# Patient Record
Sex: FEMALE | Race: WHITE | NOT HISPANIC OR LATINO | URBAN - METROPOLITAN AREA
[De-identification: names, ages, dates, MRNs, and addresses within clinical notes are randomized per-mention and may not be internally consistent; named-entity substitution may affect disease eponyms.]

---

## 2017-05-31 ENCOUNTER — OUTPATIENT (OUTPATIENT)
Dept: OUTPATIENT SERVICES | Facility: HOSPITAL | Age: 77
LOS: 1 days | Discharge: HOME | End: 2017-05-31

## 2017-06-28 ENCOUNTER — OUTPATIENT (OUTPATIENT)
Dept: OUTPATIENT SERVICES | Facility: HOSPITAL | Age: 77
LOS: 1 days | Discharge: HOME | End: 2017-06-28

## 2017-06-28 DIAGNOSIS — Z79.01 LONG TERM (CURRENT) USE OF ANTICOAGULANTS: ICD-10-CM

## 2017-06-28 DIAGNOSIS — I48.91 UNSPECIFIED ATRIAL FIBRILLATION: ICD-10-CM

## 2017-07-28 ENCOUNTER — OUTPATIENT (OUTPATIENT)
Dept: OUTPATIENT SERVICES | Facility: HOSPITAL | Age: 77
LOS: 1 days | Discharge: HOME | End: 2017-07-28

## 2017-07-28 DIAGNOSIS — Z79.01 LONG TERM (CURRENT) USE OF ANTICOAGULANTS: ICD-10-CM

## 2017-07-28 DIAGNOSIS — I48.91 UNSPECIFIED ATRIAL FIBRILLATION: ICD-10-CM

## 2017-08-24 ENCOUNTER — OUTPATIENT (OUTPATIENT)
Dept: OUTPATIENT SERVICES | Facility: HOSPITAL | Age: 77
LOS: 1 days | Discharge: HOME | End: 2017-08-24

## 2017-08-24 DIAGNOSIS — I48.91 UNSPECIFIED ATRIAL FIBRILLATION: ICD-10-CM

## 2017-08-24 DIAGNOSIS — Z79.01 LONG TERM (CURRENT) USE OF ANTICOAGULANTS: ICD-10-CM

## 2017-09-21 ENCOUNTER — OUTPATIENT (OUTPATIENT)
Dept: OUTPATIENT SERVICES | Facility: HOSPITAL | Age: 77
LOS: 1 days | Discharge: HOME | End: 2017-09-21

## 2017-09-21 DIAGNOSIS — Z79.01 LONG TERM (CURRENT) USE OF ANTICOAGULANTS: ICD-10-CM

## 2017-09-21 DIAGNOSIS — I48.91 UNSPECIFIED ATRIAL FIBRILLATION: ICD-10-CM

## 2017-09-28 ENCOUNTER — OUTPATIENT (OUTPATIENT)
Dept: OUTPATIENT SERVICES | Facility: HOSPITAL | Age: 77
LOS: 1 days | Discharge: HOME | End: 2017-09-28

## 2017-09-28 DIAGNOSIS — Z79.01 LONG TERM (CURRENT) USE OF ANTICOAGULANTS: ICD-10-CM

## 2017-09-28 DIAGNOSIS — I48.91 UNSPECIFIED ATRIAL FIBRILLATION: ICD-10-CM

## 2017-10-05 ENCOUNTER — OUTPATIENT (OUTPATIENT)
Dept: OUTPATIENT SERVICES | Facility: HOSPITAL | Age: 77
LOS: 1 days | Discharge: HOME | End: 2017-10-05

## 2017-10-05 DIAGNOSIS — Z79.01 LONG TERM (CURRENT) USE OF ANTICOAGULANTS: ICD-10-CM

## 2017-10-05 DIAGNOSIS — I48.91 UNSPECIFIED ATRIAL FIBRILLATION: ICD-10-CM

## 2017-10-12 ENCOUNTER — OUTPATIENT (OUTPATIENT)
Dept: OUTPATIENT SERVICES | Facility: HOSPITAL | Age: 77
LOS: 1 days | Discharge: HOME | End: 2017-10-12

## 2017-10-12 DIAGNOSIS — Z79.01 LONG TERM (CURRENT) USE OF ANTICOAGULANTS: ICD-10-CM

## 2017-10-12 DIAGNOSIS — I48.91 UNSPECIFIED ATRIAL FIBRILLATION: ICD-10-CM

## 2017-11-07 ENCOUNTER — OUTPATIENT (OUTPATIENT)
Dept: OUTPATIENT SERVICES | Facility: HOSPITAL | Age: 77
LOS: 1 days | Discharge: HOME | End: 2017-11-07

## 2017-11-07 DIAGNOSIS — I35.0 NONRHEUMATIC AORTIC (VALVE) STENOSIS: ICD-10-CM

## 2017-11-07 DIAGNOSIS — Z00.00 ENCOUNTER FOR GENERAL ADULT MEDICAL EXAMINATION WITHOUT ABNORMAL FINDINGS: ICD-10-CM

## 2017-11-07 DIAGNOSIS — I35.1 NONRHEUMATIC AORTIC (VALVE) INSUFFICIENCY: ICD-10-CM

## 2017-11-07 DIAGNOSIS — I48.91 UNSPECIFIED ATRIAL FIBRILLATION: ICD-10-CM

## 2017-11-09 ENCOUNTER — OUTPATIENT (OUTPATIENT)
Dept: OUTPATIENT SERVICES | Facility: HOSPITAL | Age: 77
LOS: 1 days | Discharge: HOME | End: 2017-11-09

## 2017-11-09 DIAGNOSIS — I48.91 UNSPECIFIED ATRIAL FIBRILLATION: ICD-10-CM

## 2017-11-09 DIAGNOSIS — Z79.01 LONG TERM (CURRENT) USE OF ANTICOAGULANTS: ICD-10-CM

## 2017-12-04 ENCOUNTER — OUTPATIENT (OUTPATIENT)
Dept: OUTPATIENT SERVICES | Facility: HOSPITAL | Age: 77
LOS: 1 days | Discharge: HOME | End: 2017-12-04

## 2017-12-04 DIAGNOSIS — Z79.01 LONG TERM (CURRENT) USE OF ANTICOAGULANTS: ICD-10-CM

## 2017-12-04 DIAGNOSIS — I48.91 UNSPECIFIED ATRIAL FIBRILLATION: ICD-10-CM

## 2017-12-14 ENCOUNTER — OUTPATIENT (OUTPATIENT)
Dept: OUTPATIENT SERVICES | Facility: HOSPITAL | Age: 77
LOS: 1 days | Discharge: HOME | End: 2017-12-14

## 2017-12-14 DIAGNOSIS — I48.91 UNSPECIFIED ATRIAL FIBRILLATION: ICD-10-CM

## 2017-12-14 DIAGNOSIS — Z79.01 LONG TERM (CURRENT) USE OF ANTICOAGULANTS: ICD-10-CM

## 2017-12-18 ENCOUNTER — OUTPATIENT (OUTPATIENT)
Dept: OUTPATIENT SERVICES | Facility: HOSPITAL | Age: 77
LOS: 1 days | Discharge: HOME | End: 2017-12-18

## 2017-12-18 DIAGNOSIS — I48.91 UNSPECIFIED ATRIAL FIBRILLATION: ICD-10-CM

## 2017-12-18 DIAGNOSIS — Z79.01 LONG TERM (CURRENT) USE OF ANTICOAGULANTS: ICD-10-CM

## 2017-12-27 ENCOUNTER — OUTPATIENT (OUTPATIENT)
Dept: OUTPATIENT SERVICES | Facility: HOSPITAL | Age: 77
LOS: 1 days | Discharge: HOME | End: 2017-12-27

## 2017-12-27 DIAGNOSIS — I48.91 UNSPECIFIED ATRIAL FIBRILLATION: ICD-10-CM

## 2017-12-27 DIAGNOSIS — Z79.01 LONG TERM (CURRENT) USE OF ANTICOAGULANTS: ICD-10-CM

## 2018-01-03 ENCOUNTER — OUTPATIENT (OUTPATIENT)
Dept: OUTPATIENT SERVICES | Facility: HOSPITAL | Age: 78
LOS: 1 days | Discharge: HOME | End: 2018-01-03

## 2018-01-03 DIAGNOSIS — I48.91 UNSPECIFIED ATRIAL FIBRILLATION: ICD-10-CM

## 2018-01-03 DIAGNOSIS — Z79.01 LONG TERM (CURRENT) USE OF ANTICOAGULANTS: ICD-10-CM

## 2018-01-22 ENCOUNTER — OUTPATIENT (OUTPATIENT)
Dept: OUTPATIENT SERVICES | Facility: HOSPITAL | Age: 78
LOS: 1 days | Discharge: HOME | End: 2018-01-22

## 2018-01-22 DIAGNOSIS — Z79.01 LONG TERM (CURRENT) USE OF ANTICOAGULANTS: ICD-10-CM

## 2018-01-22 DIAGNOSIS — I48.91 UNSPECIFIED ATRIAL FIBRILLATION: ICD-10-CM

## 2018-01-24 ENCOUNTER — OUTPATIENT (OUTPATIENT)
Dept: OUTPATIENT SERVICES | Facility: HOSPITAL | Age: 78
LOS: 1 days | Discharge: HOME | End: 2018-01-24

## 2018-01-29 ENCOUNTER — OUTPATIENT (OUTPATIENT)
Dept: OUTPATIENT SERVICES | Facility: HOSPITAL | Age: 78
LOS: 1 days | Discharge: HOME | End: 2018-01-29

## 2018-01-29 DIAGNOSIS — Z79.01 LONG TERM (CURRENT) USE OF ANTICOAGULANTS: ICD-10-CM

## 2018-01-29 DIAGNOSIS — I48.91 UNSPECIFIED ATRIAL FIBRILLATION: ICD-10-CM

## 2018-02-01 ENCOUNTER — OUTPATIENT (OUTPATIENT)
Dept: OUTPATIENT SERVICES | Facility: HOSPITAL | Age: 78
LOS: 1 days | Discharge: HOME | End: 2018-02-01

## 2018-02-01 DIAGNOSIS — I48.91 UNSPECIFIED ATRIAL FIBRILLATION: ICD-10-CM

## 2018-02-01 DIAGNOSIS — Z79.01 LONG TERM (CURRENT) USE OF ANTICOAGULANTS: ICD-10-CM

## 2018-02-08 ENCOUNTER — OUTPATIENT (OUTPATIENT)
Dept: OUTPATIENT SERVICES | Facility: HOSPITAL | Age: 78
LOS: 1 days | Discharge: HOME | End: 2018-02-08

## 2018-02-08 DIAGNOSIS — Z79.01 LONG TERM (CURRENT) USE OF ANTICOAGULANTS: ICD-10-CM

## 2018-02-08 DIAGNOSIS — I48.91 UNSPECIFIED ATRIAL FIBRILLATION: ICD-10-CM

## 2018-02-15 ENCOUNTER — OUTPATIENT (OUTPATIENT)
Dept: OUTPATIENT SERVICES | Facility: HOSPITAL | Age: 78
LOS: 1 days | Discharge: HOME | End: 2018-02-15

## 2018-02-15 DIAGNOSIS — I48.91 UNSPECIFIED ATRIAL FIBRILLATION: ICD-10-CM

## 2018-02-15 DIAGNOSIS — Z79.01 LONG TERM (CURRENT) USE OF ANTICOAGULANTS: ICD-10-CM

## 2018-02-22 ENCOUNTER — OUTPATIENT (OUTPATIENT)
Dept: OUTPATIENT SERVICES | Facility: HOSPITAL | Age: 78
LOS: 1 days | Discharge: HOME | End: 2018-02-22

## 2018-02-22 DIAGNOSIS — Z79.01 LONG TERM (CURRENT) USE OF ANTICOAGULANTS: ICD-10-CM

## 2018-02-22 DIAGNOSIS — I48.91 UNSPECIFIED ATRIAL FIBRILLATION: ICD-10-CM

## 2018-03-01 ENCOUNTER — OUTPATIENT (OUTPATIENT)
Dept: OUTPATIENT SERVICES | Facility: HOSPITAL | Age: 78
LOS: 1 days | Discharge: HOME | End: 2018-03-01

## 2018-03-01 DIAGNOSIS — Z79.01 LONG TERM (CURRENT) USE OF ANTICOAGULANTS: ICD-10-CM

## 2018-03-01 DIAGNOSIS — I48.91 UNSPECIFIED ATRIAL FIBRILLATION: ICD-10-CM

## 2018-03-12 ENCOUNTER — OUTPATIENT (OUTPATIENT)
Dept: OUTPATIENT SERVICES | Facility: HOSPITAL | Age: 78
LOS: 1 days | Discharge: HOME | End: 2018-03-12

## 2018-03-12 DIAGNOSIS — Z79.01 LONG TERM (CURRENT) USE OF ANTICOAGULANTS: ICD-10-CM

## 2018-03-12 DIAGNOSIS — I48.91 UNSPECIFIED ATRIAL FIBRILLATION: ICD-10-CM

## 2018-04-30 ENCOUNTER — OUTPATIENT (OUTPATIENT)
Dept: OUTPATIENT SERVICES | Facility: HOSPITAL | Age: 78
LOS: 1 days | Discharge: HOME | End: 2018-04-30

## 2018-04-30 DIAGNOSIS — Z79.01 LONG TERM (CURRENT) USE OF ANTICOAGULANTS: ICD-10-CM

## 2018-04-30 DIAGNOSIS — I48.91 UNSPECIFIED ATRIAL FIBRILLATION: ICD-10-CM

## 2018-05-04 ENCOUNTER — OUTPATIENT (OUTPATIENT)
Dept: OUTPATIENT SERVICES | Facility: HOSPITAL | Age: 78
LOS: 1 days | Discharge: HOME | End: 2018-05-04

## 2018-05-04 DIAGNOSIS — Z79.01 LONG TERM (CURRENT) USE OF ANTICOAGULANTS: ICD-10-CM

## 2018-05-04 DIAGNOSIS — I48.91 UNSPECIFIED ATRIAL FIBRILLATION: ICD-10-CM

## 2018-05-10 ENCOUNTER — OUTPATIENT (OUTPATIENT)
Dept: OUTPATIENT SERVICES | Facility: HOSPITAL | Age: 78
LOS: 1 days | Discharge: HOME | End: 2018-05-10

## 2018-05-10 DIAGNOSIS — Z79.01 LONG TERM (CURRENT) USE OF ANTICOAGULANTS: ICD-10-CM

## 2018-05-10 DIAGNOSIS — I48.91 UNSPECIFIED ATRIAL FIBRILLATION: ICD-10-CM

## 2018-05-23 ENCOUNTER — OUTPATIENT (OUTPATIENT)
Dept: OUTPATIENT SERVICES | Facility: HOSPITAL | Age: 78
LOS: 1 days | Discharge: HOME | End: 2018-05-23

## 2018-05-23 DIAGNOSIS — I48.91 UNSPECIFIED ATRIAL FIBRILLATION: ICD-10-CM

## 2018-05-23 DIAGNOSIS — Z79.01 LONG TERM (CURRENT) USE OF ANTICOAGULANTS: ICD-10-CM

## 2018-05-30 ENCOUNTER — OUTPATIENT (OUTPATIENT)
Dept: OUTPATIENT SERVICES | Facility: HOSPITAL | Age: 78
LOS: 1 days | Discharge: HOME | End: 2018-05-30

## 2018-05-30 DIAGNOSIS — Z79.01 LONG TERM (CURRENT) USE OF ANTICOAGULANTS: ICD-10-CM

## 2018-05-30 DIAGNOSIS — I48.91 UNSPECIFIED ATRIAL FIBRILLATION: ICD-10-CM

## 2018-06-08 ENCOUNTER — OUTPATIENT (OUTPATIENT)
Dept: OUTPATIENT SERVICES | Facility: HOSPITAL | Age: 78
LOS: 1 days | Discharge: HOME | End: 2018-06-08

## 2018-06-08 DIAGNOSIS — Z79.01 LONG TERM (CURRENT) USE OF ANTICOAGULANTS: ICD-10-CM

## 2018-06-08 DIAGNOSIS — I48.91 UNSPECIFIED ATRIAL FIBRILLATION: ICD-10-CM

## 2018-06-22 ENCOUNTER — OUTPATIENT (OUTPATIENT)
Dept: OUTPATIENT SERVICES | Facility: HOSPITAL | Age: 78
LOS: 1 days | Discharge: HOME | End: 2018-06-22

## 2018-06-22 DIAGNOSIS — Z79.01 LONG TERM (CURRENT) USE OF ANTICOAGULANTS: ICD-10-CM

## 2018-06-22 DIAGNOSIS — I48.91 UNSPECIFIED ATRIAL FIBRILLATION: ICD-10-CM

## 2018-07-20 ENCOUNTER — OUTPATIENT (OUTPATIENT)
Dept: OUTPATIENT SERVICES | Facility: HOSPITAL | Age: 78
LOS: 1 days | Discharge: HOME | End: 2018-07-20

## 2018-07-20 DIAGNOSIS — Z79.01 LONG TERM (CURRENT) USE OF ANTICOAGULANTS: ICD-10-CM

## 2018-07-20 DIAGNOSIS — I48.91 UNSPECIFIED ATRIAL FIBRILLATION: ICD-10-CM

## 2018-09-24 ENCOUNTER — OUTPATIENT (OUTPATIENT)
Dept: OUTPATIENT SERVICES | Facility: HOSPITAL | Age: 78
LOS: 1 days | Discharge: HOME | End: 2018-09-24

## 2018-09-24 DIAGNOSIS — I48.91 UNSPECIFIED ATRIAL FIBRILLATION: ICD-10-CM

## 2018-09-24 DIAGNOSIS — Z79.01 LONG TERM (CURRENT) USE OF ANTICOAGULANTS: ICD-10-CM

## 2018-09-24 LAB
POCT INR: 2.2 RATIO — HIGH (ref 0.9–1.2)
POCT PT: 26 SEC — HIGH (ref 10–13.4)

## 2018-10-16 ENCOUNTER — OUTPATIENT (OUTPATIENT)
Dept: OUTPATIENT SERVICES | Facility: HOSPITAL | Age: 78
LOS: 1 days | Discharge: HOME | End: 2018-10-16

## 2018-10-16 DIAGNOSIS — Z79.01 LONG TERM (CURRENT) USE OF ANTICOAGULANTS: ICD-10-CM

## 2018-10-16 DIAGNOSIS — I48.91 UNSPECIFIED ATRIAL FIBRILLATION: ICD-10-CM

## 2018-10-16 LAB
POCT INR: 3.6 RATIO — HIGH (ref 0.9–1.2)
POCT PT: 43.2 SEC — HIGH (ref 10–13.4)

## 2018-10-19 ENCOUNTER — OUTPATIENT (OUTPATIENT)
Dept: OUTPATIENT SERVICES | Facility: HOSPITAL | Age: 78
LOS: 1 days | Discharge: HOME | End: 2018-10-19

## 2018-10-19 DIAGNOSIS — I25.10 ATHEROSCLEROTIC HEART DISEASE OF NATIVE CORONARY ARTERY WITHOUT ANGINA PECTORIS: ICD-10-CM

## 2018-10-19 DIAGNOSIS — I48.91 UNSPECIFIED ATRIAL FIBRILLATION: ICD-10-CM

## 2018-10-19 DIAGNOSIS — Z79.01 LONG TERM (CURRENT) USE OF ANTICOAGULANTS: ICD-10-CM

## 2018-10-24 ENCOUNTER — OUTPATIENT (OUTPATIENT)
Dept: OUTPATIENT SERVICES | Facility: HOSPITAL | Age: 78
LOS: 1 days | Discharge: HOME | End: 2018-10-24

## 2018-10-24 DIAGNOSIS — I48.91 UNSPECIFIED ATRIAL FIBRILLATION: ICD-10-CM

## 2018-10-24 DIAGNOSIS — Z79.01 LONG TERM (CURRENT) USE OF ANTICOAGULANTS: ICD-10-CM

## 2018-10-24 LAB
POCT INR: 3.1 RATIO — HIGH (ref 0.9–1.2)
POCT PT: 37.3 SEC — HIGH (ref 10–13.4)

## 2018-11-07 ENCOUNTER — OUTPATIENT (OUTPATIENT)
Dept: OUTPATIENT SERVICES | Facility: HOSPITAL | Age: 78
LOS: 1 days | Discharge: HOME | End: 2018-11-07

## 2018-11-07 DIAGNOSIS — Z79.01 LONG TERM (CURRENT) USE OF ANTICOAGULANTS: ICD-10-CM

## 2018-11-07 DIAGNOSIS — I48.91 UNSPECIFIED ATRIAL FIBRILLATION: ICD-10-CM

## 2018-11-07 LAB
POCT INR: 3.3 RATIO — HIGH (ref 0.9–1.2)
POCT PT: 39.8 SEC — HIGH (ref 10–13.4)

## 2018-11-13 VITALS — WEIGHT: 149.91 LBS | HEIGHT: 59 IN

## 2018-11-13 NOTE — ASU PATIENT PROFILE, ADULT - PMH
Aortic valve replaced    Coronary angioplasty status    Coronary stent patent  mid lad synergy manjinder 3.5x38mm  H/O mastectomy, bilateral    History of total knee replacement, unspecified laterality Cheiloplasty (Less Than 50%) Text: A decision was made to reconstruct the defect with a  cheiloplasty.  The defect was undermined extensively.  Additional obicularis oris muscle was excised with a 15 blade scalpel.  The defect was converted into a full thickness wedge, of less than 50% of the vertical height of the lip, to facilite a better cosmetic result.  Small vessels were then tied off with 5-0 monocyrl. The obicularis oris, superficial fascia, adipose and dermis were then reapproximated.  After the deeper layers were approximated the epidermis was reapproximated with particular care given to realign the vermillion border.

## 2018-11-14 ENCOUNTER — OUTPATIENT (OUTPATIENT)
Dept: OUTPATIENT SERVICES | Facility: HOSPITAL | Age: 78
LOS: 1 days | Discharge: HOME | End: 2018-11-14

## 2018-11-14 VITALS — HEIGHT: 59 IN | WEIGHT: 149.91 LBS

## 2018-11-14 DIAGNOSIS — I48.0 PAROXYSMAL ATRIAL FIBRILLATION: ICD-10-CM

## 2018-11-14 DIAGNOSIS — Z95.2 PRESENCE OF PROSTHETIC HEART VALVE: Chronic | ICD-10-CM

## 2018-11-14 NOTE — H&P ADULT - NSHPPHYSICALEXAM_GEN_ALL_CORE
PHYSICAL EXAM:  GENERAL: NAD, well-developed  HEAD:  Atraumatic, Normocephalic  EYES: EOMI, PERRLA, conjunctiva and sclera clear  NECK: Supple, No JVD  CHEST/LUNG: Clear to auscultation bilaterally; No wheeze  HEART: s1 s2 irregular  ABDOMEN: Soft, Nontender, Nondistended; Bowel sounds present  EXTREMITIES:  2+ Peripheral Pulses, No clubbing, cyanosis, or edema  PSYCH: AAOx3  NEUROLOGY: non-focal  SKIN: No rashes or lesions

## 2018-11-14 NOTE — H&P ADULT - NSHPREVIEWOFSYSTEMS_GEN_ALL_CORE
REVIEW OF SYSTEMS:    CONSTITUTIONAL: No weakness, fevers or chills  EYES/ENT: No visual changes;  No vertigo or throat pain   NECK: No pain or stiffness  RESPIRATORY: No cough, wheezing, hemoptysis; No shortness of breath  CARDIOVASCULAR: +ve palpitations  GASTROINTESTINAL: No abdominal or epigastric pain. No nausea, vomiting, or hematemesis; No diarrhea or constipation. No melena or hematochezia.  GENITOURINARY: No dysuria, frequency or hematuria  NEUROLOGICAL: No numbness or weakness  SKIN: No itching, rashes

## 2018-11-14 NOTE — H&P ADULT - PMH
Aortic valve replaced    Coronary angioplasty status    Coronary stent patent  mid lad synergy manjinder 3.5x38mm  H/O mastectomy, bilateral    History of total knee replacement, unspecified laterality

## 2018-11-14 NOTE — CHART NOTE - NSCHARTNOTEFT_GEN_A_CORE
NARA Procedure Note:     After risks, benefits and alternatives of the procedure were explained, consent was signed and placed in the medical record.  Procedural timeout was taken.  Sedation was administered by anesthesia.  NARA probe inserted without complication and NARA performed.  Patient tolerated the procedure well without complication.  Post-procedure vital signs were stable.    NARA findings discussed with patient and referring cardiologist.       NARA findings:  LVEF 55-65%  KORIN small clot and dense smoke  MV thick and calcified with moderate MR  AV bioprosthetic with normal function trace AI  TV mild TR   Aortic Arch: Severe atherosclerosis      Recommendations:  Continue current medications including anticoagulation.

## 2019-06-19 ENCOUNTER — OUTPATIENT (OUTPATIENT)
Dept: OUTPATIENT SERVICES | Facility: HOSPITAL | Age: 79
LOS: 1 days | Discharge: HOME | End: 2019-06-19

## 2019-06-19 DIAGNOSIS — Z79.02 LONG TERM (CURRENT) USE OF ANTITHROMBOTICS/ANTIPLATELETS: ICD-10-CM

## 2019-06-19 DIAGNOSIS — E78.5 HYPERLIPIDEMIA, UNSPECIFIED: ICD-10-CM

## 2019-06-19 DIAGNOSIS — Z95.2 PRESENCE OF PROSTHETIC HEART VALVE: Chronic | ICD-10-CM

## 2019-06-19 DIAGNOSIS — I25.10 ATHEROSCLEROTIC HEART DISEASE OF NATIVE CORONARY ARTERY WITHOUT ANGINA PECTORIS: ICD-10-CM

## 2019-06-19 DIAGNOSIS — I10 ESSENTIAL (PRIMARY) HYPERTENSION: ICD-10-CM

## 2019-06-19 PROBLEM — Z96.659 PRESENCE OF UNSPECIFIED ARTIFICIAL KNEE JOINT: Chronic | Status: ACTIVE | Noted: 2018-11-13

## 2019-06-19 PROBLEM — Z95.5 PRESENCE OF CORONARY ANGIOPLASTY IMPLANT AND GRAFT: Chronic | Status: ACTIVE | Noted: 2018-11-13

## 2019-06-19 PROBLEM — Z90.13 ACQUIRED ABSENCE OF BILATERAL BREASTS AND NIPPLES: Chronic | Status: ACTIVE | Noted: 2018-11-13

## 2019-06-19 PROBLEM — Z98.61 CORONARY ANGIOPLASTY STATUS: Chronic | Status: ACTIVE | Noted: 2018-11-13

## 2019-07-10 ENCOUNTER — OUTPATIENT (OUTPATIENT)
Dept: OUTPATIENT SERVICES | Facility: HOSPITAL | Age: 79
LOS: 1 days | Discharge: HOME | End: 2019-07-10

## 2019-07-10 VITALS
RESPIRATION RATE: 18 BRPM | OXYGEN SATURATION: 99 % | HEART RATE: 86 BPM | DIASTOLIC BLOOD PRESSURE: 67 MMHG | WEIGHT: 160.06 LBS | SYSTOLIC BLOOD PRESSURE: 105 MMHG | HEIGHT: 59 IN

## 2019-07-10 DIAGNOSIS — I48.91 UNSPECIFIED ATRIAL FIBRILLATION: ICD-10-CM

## 2019-07-10 DIAGNOSIS — Z95.2 PRESENCE OF PROSTHETIC HEART VALVE: Chronic | ICD-10-CM

## 2019-07-10 RX ORDER — WARFARIN SODIUM 2.5 MG/1
1 TABLET ORAL
Qty: 0 | Refills: 0 | DISCHARGE

## 2019-07-10 RX ORDER — CLOPIDOGREL BISULFATE 75 MG/1
1 TABLET, FILM COATED ORAL
Qty: 0 | Refills: 0 | DISCHARGE

## 2019-07-10 RX ORDER — AMIODARONE HYDROCHLORIDE 400 MG/1
1 TABLET ORAL
Qty: 0 | Refills: 0 | DISCHARGE

## 2019-07-10 NOTE — H&P CARDIOLOGY - HISTORY OF PRESENT ILLNESS
78y Female here for elective NARA/ DCCV for atrial fibrillation. Patient denies any chest pain or SOB. Patient is compliant with anticoagulant therapy. pt has h/o Afib on eliquis, CAD s/p PCI, HTN, AS s/p TAVR, paroxysmal afib, KORIN thrombus and mitral regurgitation.     EKG: Atrial Fibrillation    Physical Exam:    General: NAD  Neurology: A&Ox3, nonfocal  Eyes: PERRLA/ EOMI.  ENT/Neck: No JVD.   Respiratory: CTA B/L, No wheezing, rales, rhonchi  CV: S1S2, irregular   Abdominal: Soft, NT, ND +BS.  Extremities: No edema, + peripheral pulses B/L.   Skin: No Rashes, Hematoma, Ecchymosis    Labs: reviewed

## 2019-07-10 NOTE — CHART NOTE - NSCHARTNOTEFT_GEN_A_CORE
NARA Post Procedure Note:    After risks, benefits and alternatives of the procedure were explained, consent was signed and placed in the medical record.  Procedural timeout was taken.  Sedation was administered by anesthesia.  NARA probe inserted without complication and NARA performed.  Patient tolerated the procedure well without complication.  Post-procedure vital signs were stable.      NARA findings:  LVEF- normal   mild MR, trace AR  bioprosthetic AV- normally functioning  spontaneous echo contrast in LA  thrombus seen in KORIN   full report to follow       Recommendation:  Cardioversion was not performed   cont AC  follow up as out pt NARA Post Procedure Note:    After risks, benefits and alternatives of the procedure were explained, consent was signed and placed in the medical record.  Procedural timeout was taken.  Sedation was administered by anesthesia.  NARA probe inserted without complication and NARA performed.  Patient tolerated the procedure well without complication.  Post-procedure vital signs were stable.      NARA findings:  LVEF- normal   mild MR, trace TR  bioprosthetic AV- normally functioning  spontaneous echo contrast in LA  thrombus seen in KORIN   full report to follow       Recommendation:  Cardioversion was not performed   cont AC  follow up as out pt

## 2020-09-08 ENCOUNTER — INPATIENT (INPATIENT)
Facility: HOSPITAL | Age: 80
LOS: 0 days | Discharge: HOME HEALTH | End: 2020-09-09
Attending: INTERNAL MEDICINE | Admitting: INTERNAL MEDICINE
Payer: MEDICARE

## 2020-09-08 VITALS — WEIGHT: 164.91 LBS | HEIGHT: 59 IN

## 2020-09-08 DIAGNOSIS — Z95.2 PRESENCE OF PROSTHETIC HEART VALVE: Chronic | ICD-10-CM

## 2020-09-08 DIAGNOSIS — Z88.8 ALLERGY STATUS TO OTHER DRUGS, MEDICAMENTS AND BIOLOGICAL SUBSTANCES: ICD-10-CM

## 2020-09-08 DIAGNOSIS — N18.3 CHRONIC KIDNEY DISEASE, STAGE 3 (MODERATE): ICD-10-CM

## 2020-09-08 LAB
ALBUMIN SERPL ELPH-MCNC: 3.6 G/DL — SIGNIFICANT CHANGE UP (ref 3.5–5.2)
ALP SERPL-CCNC: 85 U/L — SIGNIFICANT CHANGE UP (ref 30–115)
ALT FLD-CCNC: 7 U/L — SIGNIFICANT CHANGE UP (ref 0–41)
ANION GAP SERPL CALC-SCNC: 14 MMOL/L — SIGNIFICANT CHANGE UP (ref 7–14)
ANION GAP SERPL CALC-SCNC: 17 MMOL/L — HIGH (ref 7–14)
APTT BLD: 38.2 SEC — SIGNIFICANT CHANGE UP (ref 27–39.2)
AST SERPL-CCNC: 19 U/L — SIGNIFICANT CHANGE UP (ref 0–41)
BILIRUB SERPL-MCNC: 0.3 MG/DL — SIGNIFICANT CHANGE UP (ref 0.2–1.2)
BUN SERPL-MCNC: 23 MG/DL — HIGH (ref 10–20)
BUN SERPL-MCNC: 30 MG/DL — HIGH (ref 10–20)
CALCIUM SERPL-MCNC: 9 MG/DL — SIGNIFICANT CHANGE UP (ref 8.5–10.1)
CALCIUM SERPL-MCNC: 9.1 MG/DL — SIGNIFICANT CHANGE UP (ref 8.5–10.1)
CHLORIDE SERPL-SCNC: 100 MMOL/L — SIGNIFICANT CHANGE UP (ref 98–110)
CHLORIDE SERPL-SCNC: 103 MMOL/L — SIGNIFICANT CHANGE UP (ref 98–110)
CO2 SERPL-SCNC: 19 MMOL/L — SIGNIFICANT CHANGE UP (ref 17–32)
CO2 SERPL-SCNC: 21 MMOL/L — SIGNIFICANT CHANGE UP (ref 17–32)
CREAT SERPL-MCNC: 1.5 MG/DL — SIGNIFICANT CHANGE UP (ref 0.7–1.5)
CREAT SERPL-MCNC: 1.6 MG/DL — HIGH (ref 0.7–1.5)
D DIMER BLD IA.RAPID-MCNC: 805 NG/ML DDU — HIGH (ref 0–230)
GLUCOSE BLDC GLUCOMTR-MCNC: 204 MG/DL — HIGH (ref 70–99)
GLUCOSE SERPL-MCNC: 104 MG/DL — HIGH (ref 70–99)
GLUCOSE SERPL-MCNC: 143 MG/DL — HIGH (ref 70–99)
HCT VFR BLD CALC: 47.6 % — HIGH (ref 37–47)
HGB BLD-MCNC: 14.6 G/DL — SIGNIFICANT CHANGE UP (ref 12–16)
INR BLD: 2.43 RATIO — HIGH (ref 0.65–1.3)
MAGNESIUM SERPL-MCNC: 1.9 MG/DL — SIGNIFICANT CHANGE UP (ref 1.8–2.4)
MCHC RBC-ENTMCNC: 26.1 PG — LOW (ref 27–31)
MCHC RBC-ENTMCNC: 30.7 G/DL — LOW (ref 32–37)
MCV RBC AUTO: 85.2 FL — SIGNIFICANT CHANGE UP (ref 81–99)
NRBC # BLD: 0 /100 WBCS — SIGNIFICANT CHANGE UP (ref 0–0)
NT-PROBNP SERPL-SCNC: 871 PG/ML — HIGH (ref 0–300)
OSMOLALITY SERPL: 295 MOS/KG — SIGNIFICANT CHANGE UP (ref 280–301)
PLATELET # BLD AUTO: 295 K/UL — SIGNIFICANT CHANGE UP (ref 130–400)
POTASSIUM SERPL-MCNC: 4.3 MMOL/L — SIGNIFICANT CHANGE UP (ref 3.5–5)
POTASSIUM SERPL-MCNC: SIGNIFICANT CHANGE UP MMOL/L (ref 3.5–5)
POTASSIUM SERPL-SCNC: 4.3 MMOL/L — SIGNIFICANT CHANGE UP (ref 3.5–5)
POTASSIUM SERPL-SCNC: SIGNIFICANT CHANGE UP MMOL/L (ref 3.5–5)
PROT SERPL-MCNC: 6.7 G/DL — SIGNIFICANT CHANGE UP (ref 6–8)
PROTHROM AB SERPL-ACNC: 27.9 SEC — HIGH (ref 9.95–12.87)
RBC # BLD: 5.59 M/UL — HIGH (ref 4.2–5.4)
RBC # FLD: 14.3 % — SIGNIFICANT CHANGE UP (ref 11.5–14.5)
SODIUM SERPL-SCNC: 133 MMOL/L — LOW (ref 135–146)
SODIUM SERPL-SCNC: 141 MMOL/L — SIGNIFICANT CHANGE UP (ref 135–146)
TROPONIN T SERPL-MCNC: <0.01 NG/ML — SIGNIFICANT CHANGE UP
WBC # BLD: 11.96 K/UL — HIGH (ref 4.8–10.8)
WBC # FLD AUTO: 11.96 K/UL — HIGH (ref 4.8–10.8)

## 2020-09-08 PROCEDURE — 99285 EMERGENCY DEPT VISIT HI MDM: CPT

## 2020-09-08 PROCEDURE — 99223 1ST HOSP IP/OBS HIGH 75: CPT

## 2020-09-08 PROCEDURE — 99497 ADVNCD CARE PLAN 30 MIN: CPT | Mod: 25

## 2020-09-08 PROCEDURE — 71045 X-RAY EXAM CHEST 1 VIEW: CPT | Mod: 26

## 2020-09-08 PROCEDURE — 93010 ELECTROCARDIOGRAM REPORT: CPT

## 2020-09-08 RX ORDER — FUROSEMIDE 40 MG
40 TABLET ORAL ONCE
Refills: 0 | Status: COMPLETED | OUTPATIENT
Start: 2020-09-08 | End: 2020-09-08

## 2020-09-08 RX ORDER — DILTIAZEM HCL 120 MG
240 CAPSULE, EXT RELEASE 24 HR ORAL DAILY
Refills: 0 | Status: DISCONTINUED | OUTPATIENT
Start: 2020-09-08 | End: 2020-09-09

## 2020-09-08 RX ORDER — RIVAROXABAN 15 MG-20MG
20 KIT ORAL
Refills: 0 | Status: DISCONTINUED | OUTPATIENT
Start: 2020-09-08 | End: 2020-09-09

## 2020-09-08 RX ORDER — TRIAMTERENE/HYDROCHLOROTHIAZID 75 MG-50MG
1 TABLET ORAL DAILY
Refills: 0 | Status: DISCONTINUED | OUTPATIENT
Start: 2020-09-08 | End: 2020-09-09

## 2020-09-08 RX ADMIN — RIVAROXABAN 20 MILLIGRAM(S): KIT at 18:44

## 2020-09-08 RX ADMIN — Medication 40 MILLIGRAM(S): at 16:08

## 2020-09-08 NOTE — H&P ADULT - HISTORY OF PRESENT ILLNESS
Pt is an 79 yo female with PMH of Afib s/p DCCV on xarelto, CAD s/p PCI, HTN, AS s/p TVR, KORIN thrombus on NARA in 2019 and MR (on Xarelto) presented to the ED with progressive Shortness of breath worsened with exertion onset two weeks ago with new onset dry cough. Patient recently was in [place] visiting family, denies any sick contacts, fevers, leg pain, swelling, immobility, or orthopnea. Patient states that she is compliant with her medication, and was supposed to follow up with cardiologist Dr. Bloom recently but however was not able to follow up due to the pandemic.  Had a stress test (negative), NARA in 2019 found to have left atrial thrombus and MR. She otherwise denies any current chest pain, shortness of breath, cough since presentation, fevers, chills, or voiding abnormalities.     ED course: BP: 144/63, HR:111, T:97.8, RR: 18, SPO2 97% on room air on admission, EKG: Afib, rate controlled, CXR: Mild Right sided pleural efffusion, , mild hyponatremia, mild leukocytosis, Pt is an 81 yo female with PMH of Afib s/p DCCV on xarelto, CAD s/p PCI, HTN, AS s/p TVR, KORIN thrombus on NARA in 2019 and MR (on Xarelto) presented to the ED with progressive Shortness of breath worsened with exertion onset two weeks ago with new onset dry cough. Patient recently was in [place] visiting family, denies any sick contacts, fevers, leg pain, swelling, immobility, or orthopnea. Patient states that she is compliant with her medication, and was supposed to follow up with cardiologist Dr. Bloom recently but however was not able to follow up due to the pandemic.  Had a stress test (negative), NARA in 2019 found to have left atrial thrombus and MR. She otherwise denies any current chest pain, shortness of breath, cough since presentation, fevers, chills, no recent antibiotic use or voiding abnormalities.     ED course: BP: 144/63, HR:111, T:97.8, RR: 18, SPO2 97% on room air on admission, EKG: Afib, rate controlled, CXR: Mild Right sided pleural efffusion, , mild hyponatremia, mild leukocytosis

## 2020-09-08 NOTE — ED ADULT NURSE NOTE - OBJECTIVE STATEMENT
Patient is a 80 year old female returning from [place] last week. Patient is now complaining of shortness on breath on exertion, subsides when sitting down.

## 2020-09-08 NOTE — H&P ADULT - NSHPSOCIALHISTORY_GEN_ALL_CORE
Marital Status:  ( X )    (   ) Single    (   )    (  )   Lives with: ( X) alone  (  ) children   (  ) spouse   (  ) parents  (  ) other  Recent Travel: No recent travel  Occupation: retired    Substance Use (street drugs): ( x ) never used  (  ) other:  Tobacco Usage:  ( x  ) never smoked   (   ) former smoker   (   ) current smoker  (     ) pack year  Alcohol Usage: None   Baseline mobility:

## 2020-09-08 NOTE — ED PROVIDER NOTE - OBJECTIVE STATEMENT
Pt with hx of CAD with stent and AVR on Coumadin here for eval of SOB, CROSS x 1 week. Denies fever, chills, cough, CP, leg swelling or pain or orthopnea. Pt admits to recently traveling to and from [place] via plane 1 week ago. Had a stress test some time in the past year and to her knowledge was good.

## 2020-09-08 NOTE — H&P ADULT - ASSESSMENT
Pt is an 79 yo female with PMH of Afib s/p DCCV 2019, CAD s/p PCI, HTN, AS s/p TVR, KORIN thrombus on NARA in 2019 and MR (on Xarelto) presented to the ED with progressive Shortness of breath worsened with exertion onset two weeks ago with new onset dry cough.     Shortness of Breath + Cough likely secondary to  CAP RLL PNA   No sepsis on admission  - SOB + non productive cough, CXR with mild RLL effusion, mild leukocytosis  - currently 97% SPO2 on room air   - Patient endorses dyspnea on exertion , will repeat ECHO  - c/w Levaquin for now, QTC wnl (allergy to cefazolin, doxycycline oxacillin: swelling in the past)  - EKG atrial fibrillation, rate controlled  - Troponin neg x1  - f/u urine legionella, strep  - supportive care    Atrial Fibrillation, rate controlled  - c/w Cardizem   - c/w Xarelto    CAD s/p PCI   AS s/p AVR, Left atrial thrombus on NARA 2019  - c/w triamterene-HCTZ  - c/w Xarelto 20 mg   - f/u ECHO    MARU on CKD III  - per chart review, patient baseline cr 1.3, likely has underlying CKD  - Cr. 1.6 today, will repeat BMP  - urine lytes  - will hold off on IVF for now, patient appears euvolemic    CODE: Full  Diet: DASH   Dispo: from home Pt is an 79 yo female with PMH of Afib s/p DCCV 2019, CAD s/p PCI, HTN, AS s/p TVR, KORIN thrombus on NARA in 2019 and MR (on Xarelto) presented to the ED with progressive Shortness of breath worsened with exertion onset two weeks ago with new onset dry cough.     Shortness of Breath + Cough likely secondary to  CAP RLL PNA   No sepsis on admission  - SOB + non productive cough, CXR with mild RLL effusion, mild leukocytosis  - currently 97% SPO2 on room air   - Patient endorses dyspnea on exertion , will repeat ECHO  - c/w Levaquin for now, QTC wnl (allergy to cefazolin, doxycycline oxacillin: swelling in the past)  - EKG atrial fibrillation, rate controlled  - Troponin neg x1  - f/u urine legionella, strep  - supportive care    Atrial Fibrillation, rate controlled  - c/w Cardizem   - c/w Xarelto 20 mg     CAD s/p PCI   AS s/p AVR, Left atrial thrombus on NARA 2019  - c/w triamterene-HCTZ  - c/w Xarelto 20 mg   - f/u ECHO    MARU on CKD III  - per chart review, patient baseline cr 1.3, likely has underlying CKD  - Cr. 1.6 today, will repeat BMP  - urine lytes  - will hold off on IVF for now, patient appears euvolemic    CODE: Full  Diet: DASH   Dispo: from home

## 2020-09-08 NOTE — H&P ADULT - NSHPLABSRESULTS_GEN_ALL_CORE
14.6   11.96 )-----------( 295      ( 08 Sep 2020 13:09 )             47.6   09-08    133<L>  |  100  |  23<H>  ----------------------------<  104<H>  TNP   |  19  |  1.6<H>    Ca    9.1      08 Sep 2020 13:09

## 2020-09-08 NOTE — ED PROVIDER NOTE - NS ED ROS FT
CONST: No fever, chills or bodyaches  EYES: No pain, redness, drainage or visual changes.  ENT: No ear pain or discharge, nasal discharge or congestion. No sore throat  CARD: No chest pain, palpitations  RESP: No cough, hemoptysis. No hx of asthma or COPD  GI: No abdominal pain, N/V/D  MS: No joint pain, back pain or extremity pain/injury  SKIN: No rashes  NEURO: No headache, dizziness, paresthesias or LOC  : No dysuria

## 2020-09-08 NOTE — ED PROVIDER NOTE - CLINICAL SUMMARY MEDICAL DECISION MAKING FREE TEXT BOX
Bedside sono with pleural effusion, ?infiltrate, so abx given. Labs ok. D-Dimer elevated but nonspecific as INR is therapeutic. Will admit for further eval.

## 2020-09-08 NOTE — ED PROVIDER NOTE - ATTENDING CONTRIBUTION TO CARE
79yo woman h/o CAD s/p stent, afib, AVR on coumadin c/o CROSS, fatigue, lack of energy over the past 1 week. Pt had been visiting family in [place], came home by plane but had sx before traveling. No fever, chills, cough, or chest pain. No leg swelling. No prior episodes. On exam she is alert and nontoxic appearing, afebrile. Moist MMs, lungs with decreased BS at the R base, otherwise CTA, CVS1S2 irregular, abd soft, NT, no peripheral edema. Concerned for CHF/effusion/anginal equivalent, less likely PNA/PE. Will check labs, CXR, bedside sono, likely admit.

## 2020-09-08 NOTE — ED PROVIDER NOTE - PHYSICAL EXAMINATION
CONST: Well appearing in NAD  EYES: PERRL, EOMI, Sclera and conjunctiva clear.   ENT: No nasal discharge. TM's clear B/L without drainage. Oropharynx normal appearing, no erythema or exudates. Uvula midline.  NECK: Non-tender, no meningeal signs. No JVD  CARD: Normal S1 S2; Normal rate and rhythm  RESP: slight crackles right base  GI: Soft, non-tender, non-distended.  MS: Normal ROM in all extremities. No midline spinal tenderness. No vcalf swelling edema or tenderness  SKIN: Warm, dry, no acute rashes. Good turgor  NEURO: A&Ox3, No focal deficits. Strength 5/5 with no sensory deficits. Steady gait

## 2020-09-08 NOTE — H&P ADULT - ATTENDING COMMENTS
79 YO F with a PMH of Afib s/p DCCV on xarelto, CAD s/p PCI, HTN, AS s/p TVR, KORIN thrombus on NARA in 2019 and MR (on Xarelto) who presents to the hospital with a c/o progressively worsening SOB over the past x 2 weeks. + orthopnea, + non-productive cough. Takes medications daily. Diet is high in salt. Denies any CP, palpitations, N/V/D, ABD pain, dysuria, or rashes. Of note, Dr. Mendoza recently increased pts diltiazem from 180 to 240 x 4 days ago. In the ED, Chest X-ray shows right-sided pleural effusion and cardiomegaly. BNP elevated. Started on PO Lasix and IV ABXs (Levofloxacin)    Physical exam shows pt in NAD. VSS, afebrile, not hypoxic on RA. A&Ox3. Non-focal neuro exam. Muscle strength/sensation intact. Decreased breath sound in the right lower lung field. Irregular rhythm, systolic murmur. ABD is soft and non-tender, normoactive BSs. No LE swelling. No rashes. Labs and radiology as above.    Dyspnea due to new onset heart failure, doubt community acquired pneumonia. IV Lasix and transition to PO. Echo. Monitor daily weights, Is&Os, and diet/fluid restriction. BMP in the AM. Send procal and CRP. Stop ABXs when negative. Restart home meds.    MARU vs CKD3, likely CRS. Send UA, urine lytes, urine pr:cr ratio, and trend BMP. Monitor urinary out-put. Hold nephrotoxic agents.     Mild HAGMA from uremic acidosis. Repeat BMP in the AM.     Hyperglycemia. A1c. FSs. Insulin PRN.     HX of Afib s/p DCCV on xarelto, CAD s/p PCI, HTN, AS s/p TVR, KORIN thrombus on NARA in 2019 and MR (on Xarelto). Restart home meds. DVT PPX. Inform PCP of pt's admission to hospital. My note supersedes the residents note.

## 2020-09-08 NOTE — H&P ADULT - NSHPREVIEWOFSYSTEMS_GEN_ALL_CORE
REVIEW OF SYSTEMS:    CONSTITUTIONAL: No weakness, fevers or chills  EYES/ENT: No visual changes;  No vertigo or throat pain   NECK: No pain or stiffness  RESPIRATORY: + cough, dyspnea on exesion  CARDIOVASCULAR: No chest pain or palpitations  GASTROINTESTINAL: No abdominal or epigastric pain. No nausea, vomiting, or hematemesis; No diarrhea or constipation. No melena or hematochezia.  GENITOURINARY: No dysuria, frequency or hematuria  NEUROLOGICAL: No numbness or weakness  SKIN: No itching, rashes

## 2020-09-08 NOTE — H&P ADULT - NSHPPHYSICALEXAM_GEN_ALL_CORE
General: well developed, well nourished, NAD  Neuro: alert and oriented, no focal deficits, moves all extremities spontaneously  HEENT: NCAT, EOMI, anicteric, mucosa moist  Respiratory: + patent air entry, Right sided crackles, no wheezing  CVS: irregular, left sided systolic murmer  Abdomen: soft, nontender, nondistended  Extremities: no edema, sensation and movement grossly intact  Skin: warm, dry, LLE eccymosis

## 2020-09-09 VITALS
HEART RATE: 97 BPM | TEMPERATURE: 97 F | SYSTOLIC BLOOD PRESSURE: 116 MMHG | DIASTOLIC BLOOD PRESSURE: 68 MMHG | RESPIRATION RATE: 18 BRPM

## 2020-09-09 LAB
APPEARANCE UR: CLEAR — SIGNIFICANT CHANGE UP
BACTERIA # UR AUTO: NEGATIVE — SIGNIFICANT CHANGE UP
BASOPHILS # BLD AUTO: 0.08 K/UL — SIGNIFICANT CHANGE UP (ref 0–0.2)
BASOPHILS NFR BLD AUTO: 0.8 % — SIGNIFICANT CHANGE UP (ref 0–1)
BILIRUB UR-MCNC: NEGATIVE — SIGNIFICANT CHANGE UP
COLOR SPEC: SIGNIFICANT CHANGE UP
DIFF PNL FLD: NEGATIVE — SIGNIFICANT CHANGE UP
EOSINOPHIL # BLD AUTO: 0.08 K/UL — SIGNIFICANT CHANGE UP (ref 0–0.7)
EOSINOPHIL NFR BLD AUTO: 0.8 % — SIGNIFICANT CHANGE UP (ref 0–8)
EPI CELLS # UR: 2 /HPF — SIGNIFICANT CHANGE UP (ref 0–5)
GLUCOSE UR QL: NEGATIVE — SIGNIFICANT CHANGE UP
HCT VFR BLD CALC: 42.2 % — SIGNIFICANT CHANGE UP (ref 37–47)
HGB BLD-MCNC: 13.1 G/DL — SIGNIFICANT CHANGE UP (ref 12–16)
HYALINE CASTS # UR AUTO: 0 /LPF — SIGNIFICANT CHANGE UP (ref 0–7)
IMM GRANULOCYTES NFR BLD AUTO: 0.4 % — HIGH (ref 0.1–0.3)
KETONES UR-MCNC: NEGATIVE — SIGNIFICANT CHANGE UP
LEUKOCYTE ESTERASE UR-ACNC: ABNORMAL
LYMPHOCYTES # BLD AUTO: 1.84 K/UL — SIGNIFICANT CHANGE UP (ref 1.2–3.4)
LYMPHOCYTES # BLD AUTO: 18.7 % — LOW (ref 20.5–51.1)
MAGNESIUM SERPL-MCNC: 1.8 MG/DL — SIGNIFICANT CHANGE UP (ref 1.8–2.4)
MCHC RBC-ENTMCNC: 26.2 PG — LOW (ref 27–31)
MCHC RBC-ENTMCNC: 31 G/DL — LOW (ref 32–37)
MCV RBC AUTO: 84.4 FL — SIGNIFICANT CHANGE UP (ref 81–99)
MONOCYTES # BLD AUTO: 0.79 K/UL — HIGH (ref 0.1–0.6)
MONOCYTES NFR BLD AUTO: 8 % — SIGNIFICANT CHANGE UP (ref 1.7–9.3)
NEUTROPHILS # BLD AUTO: 7.03 K/UL — HIGH (ref 1.4–6.5)
NEUTROPHILS NFR BLD AUTO: 71.3 % — SIGNIFICANT CHANGE UP (ref 42.2–75.2)
NITRITE UR-MCNC: NEGATIVE — SIGNIFICANT CHANGE UP
NRBC # BLD: 0 /100 WBCS — SIGNIFICANT CHANGE UP (ref 0–0)
PH UR: 5.5 — SIGNIFICANT CHANGE UP (ref 5–8)
PLATELET # BLD AUTO: 243 K/UL — SIGNIFICANT CHANGE UP (ref 130–400)
PROT UR-MCNC: NEGATIVE — SIGNIFICANT CHANGE UP
RBC # BLD: 5 M/UL — SIGNIFICANT CHANGE UP (ref 4.2–5.4)
RBC # FLD: 14.3 % — SIGNIFICANT CHANGE UP (ref 11.5–14.5)
RBC CASTS # UR COMP ASSIST: 1 /HPF — SIGNIFICANT CHANGE UP (ref 0–4)
SARS-COV-2 RNA SPEC QL NAA+PROBE: SIGNIFICANT CHANGE UP
SP GR SPEC: 1.01 — SIGNIFICANT CHANGE UP (ref 1.01–1.02)
UROBILINOGEN FLD QL: SIGNIFICANT CHANGE UP
WBC # BLD: 9.86 K/UL — SIGNIFICANT CHANGE UP (ref 4.8–10.8)
WBC # FLD AUTO: 9.86 K/UL — SIGNIFICANT CHANGE UP (ref 4.8–10.8)
WBC UR QL: 12 /HPF — HIGH (ref 0–5)

## 2020-09-09 PROCEDURE — 71045 X-RAY EXAM CHEST 1 VIEW: CPT | Mod: 26

## 2020-09-09 PROCEDURE — 99239 HOSP IP/OBS DSCHRG MGMT >30: CPT

## 2020-09-09 RX ORDER — FUROSEMIDE 40 MG
1 TABLET ORAL
Qty: 15 | Refills: 0
Start: 2020-09-09 | End: 2020-10-08

## 2020-09-09 RX ORDER — INFLUENZA VIRUS VACCINE 15; 15; 15; 15 UG/.5ML; UG/.5ML; UG/.5ML; UG/.5ML
0.5 SUSPENSION INTRAMUSCULAR ONCE
Refills: 0 | Status: DISCONTINUED | OUTPATIENT
Start: 2020-09-09 | End: 2020-09-09

## 2020-09-09 RX ORDER — APIXABAN 2.5 MG/1
1 TABLET, FILM COATED ORAL
Qty: 0 | Refills: 0 | DISCHARGE

## 2020-09-09 RX ORDER — FUROSEMIDE 40 MG
20 TABLET ORAL ONCE
Refills: 0 | Status: COMPLETED | OUTPATIENT
Start: 2020-09-09 | End: 2020-09-09

## 2020-09-09 RX ADMIN — Medication 20 MILLIGRAM(S): at 17:29

## 2020-09-09 RX ADMIN — RIVAROXABAN 20 MILLIGRAM(S): KIT at 17:20

## 2020-09-09 RX ADMIN — Medication 240 MILLIGRAM(S): at 05:35

## 2020-09-09 RX ADMIN — Medication 1 TABLET(S): at 05:35

## 2020-09-09 NOTE — DISCHARGE NOTE PROVIDER - CARE PROVIDER_API CALL
León Bloom  CARDIOVASCULAR DISEASE  [address], [address]  [place] [address]  Phone: [phone]  Fax: [phone]  Follow Up Time: 1 week    Marcio Pollard  GERIATRIC MEDICINE  [address] [place] [address]  Phone: [phone]  Fax: ([phone]  Follow Up Time: 1 week

## 2020-09-09 NOTE — PHARMACOTHERAPY INTERVENTION NOTE - COMMENTS
Patient is managed on Xarelto 20mg daily with food for A.fib. Patient eGFR 32 ml/min/m2 - recommend renal dosage adjustment to Xarelto 15mg daily with food.   Indication on current order states DVT/PE treatment, recommended updating to reflect reason for treatment with Xarelto. Patient is managed on Xarelto 20mg daily with food for A.fib and CAD with left venous thrombus in 2019. Patient eGFR 32 ml/min/m2 - recommend renal dosage adjustment to Xarelto 15mg daily with food.

## 2020-09-09 NOTE — DISCHARGE NOTE PROVIDER - NSDCMRMEDTOKEN_GEN_ALL_CORE_FT
aspirin 81 mg oral tablet: 1 tab(s) orally once a day  atorvastatin 20 mg oral tablet: 1 tab(s) orally once a day  dilTIAZem 180 mg/24 hours oral capsule, extended release: 1 cap(s) orally once a day  triamterene-hydrochlorothiazide 37.5 mg-25 mg oral tablet: 1 tab(s) orally once a day  Xarelto 20 mg oral tablet: 1 tab(s) orally once a day (in the evening) aspirin 81 mg oral tablet: 1 tab(s) orally once a day  atorvastatin 20 mg oral tablet: 1 tab(s) orally once a day  dilTIAZem 180 mg/24 hours oral capsule, extended release: 1 cap(s) orally once a day  Lasix 20 mg oral tablet: 1 tab(s) orally every other day   triamterene-hydrochlorothiazide 37.5 mg-25 mg oral tablet: 1 tab(s) orally once a day  Xarelto 20 mg oral tablet: 1 tab(s) orally once a day (in the evening)

## 2020-09-09 NOTE — DISCHARGE NOTE NURSING/CASE MANAGEMENT/SOCIAL WORK - PATIENT PORTAL LINK FT
You can access the FollowMyHealth Patient Portal offered by [facility] by registering at the following website: [email or web address]. By joining [employer]’s FollowMyHealth portal, you will also be able to view your health information using other applications (apps) compatible with our system.

## 2020-09-09 NOTE — PROGRESS NOTE ADULT - ASSESSMENT
HPI:  Pt is an 81 yo female with PMH of Afib s/p DCCV on xarelto, CAD s/p PCI, HTN, AS s/p TVR, KORIN thrombus on NARA in 2019 and MR (on Xarelto) presented to the ED with progressive Shortness of breath worsened with exertion onset two weeks ago with new onset dry cough. Patient recently was in [place] visiting family, denies any sick contacts, fevers, leg pain, swelling, immobility, or orthopnea. Patient states that she is compliant with her medication, and was supposed to follow up with cardiologist Dr. Bloom recently but however was not able to follow up due to the pandemic.  Had a stress test (negative), NARA in 2019 found to have left atrial thrombus and MR. She otherwise denies any current chest pain, shortness of breath, cough since presentation, fevers, chills, no recent antibiotic use or voiding abnormalities.     ED course: BP: 144/63, HR:111, T:97.8, RR: 18, SPO2 97% on room air on admission, EKG: Afib, rate controlled, CXR: Mild Right sided pleural efffusion, , mild hyponatremia, mild leukocytosis (08 Sep 2020 16:41)    #Acute dyspnea secondary to fluid overload secondary to acute congestive heart failure suspected to be diastolic  secondary to h/o chronic atrial fibrilation   dw dr bloom - outpatient follow up for full workup   I ambulated patient where patient was ambulating without assistance - pulse ox 90 - 96 percent   elevated bnp   lasix 20 mg q48h     #CAD sp PCI     #HTN   Vital Signs Last 24 Hrs  BP: 116/68 (09 Sep 2020 15:39) (108/66 - 130/70)  controlled    #Hyponatremia - resolved     #Obesity BMI 33 patient needs to see dieitian outpatient for further evaluation     Progress Note Handoff    Pending:  dc home on lasix q48h, time spent 41 minutes  Family discussion: patient verbalized understanding and agreeable to plan of care     Disposition: Home___

## 2020-09-09 NOTE — DISCHARGE NOTE PROVIDER - HOSPITAL COURSE
Pt is an 81 yo female with PMH of Afib s/p DCCV on xarelto, CAD s/p PCI, HTN, AS s/p TVR, KORIN thrombus on NARA in 2019 and MR (on Xarelto) presented to the ED with progressive Shortness of breath worsened with exertion onset two weeks ago with new onset dry cough. Patient recently was in [place] visiting family, denies any sick contacts, fevers, leg pain, swelling, immobility, or orthopnea. Patient states that she is compliant with her medication, and was supposed to follow up with cardiologist Dr. Bloom recently but however was not able to follow up due to the pandemic.  Had a stress test (negative), NARA in 2019 found to have left atrial thrombus and MR. She otherwise denies any current chest pain, shortness of breath, cough since presentation, fevers, chills, no recent antibiotic use or voiding abnormalities.     ED course: BP: 144/63, HR:111, T:97.8, RR: 18, SPO2 97% on room air on admission, EKG: Afib, rate controlled, CXR: Mild Right sided pleural efffusion, , mild hyponatremia, mild leukocytosis.        Problems addressed during hospitalization:        #Shortness of Breath     -No sepsis on admission    - SOB + non productive cough, CXR with mild RLL effusion, mild leukocytosis    - currently 97% SPO2 on room air     -s/p antibiotics with levofloxacin     - Patient endorses dyspnea on exertion , repeat ECHO    - Troponin neg x1    -chest xray repeated-->RLL pleural effusion        #Atrial Fibrillation, rate controlled    - c/w Cardizem     - c/w Xarelto 20 mg         #CAD s/p PCI     -AS s/p AVR, Left atrial thrombus on NARA 2019    - s/p lasix 40mg po once in ED -->c/w triamterene-HCTZ    - c/w Xarelto 20 mg     - ECHO        MARU on CKD III    -Creatinine trending down        Patient seen and examined today. Clinically and hemodynamically stable.    Can be discharged today.

## 2020-09-09 NOTE — DISCHARGE NOTE PROVIDER - NSDCCPCAREPLAN_GEN_ALL_CORE_FT
PRINCIPAL DISCHARGE DIAGNOSIS  Diagnosis: Pleural effusion, right  Assessment and Plan of Treatment: You were admitted with dyspnea and found to have bilateral pleural effusion, more prominent over the right.  Likely related to heart. No signs of acute infection like pneumonia.  Symptoms can include:  Trouble breathing – At first, people might have trouble breathing only with activity. Over time, they can also have trouble breathing at rest or when lying down.  Swelling in the feet, ankles, legs, or belly  Feeling tired

## 2020-09-09 NOTE — PROGRESS NOTE ADULT - SUBJECTIVE AND OBJECTIVE BOX
HOLLY RUSTITUTA  80y  [id number]      Patient is a 80y old  Female who presents with a chief complaint of Shortness of breath with non productive cough (09 Sep 2020 11:47)      INTERVAL HPI/OVERNIGHT EVENTS:  no acute events overnight      REVIEW OF SYSTEMS:  CONSTITUTIONAL: No fever, weight loss, or fatigue  EYES: No eye pain, visual disturbances, or discharge  ENMT:  No difficulty hearing, tinnitus, vertigo; No sinus or throat pain  NECK: No pain or stiffness  BREASTS: No pain, masses, or nipple discharge  RESPIRATORY: Mild  shortness of breath  CARDIOVASCULAR: No chest pain, palpitations, dizziness, or leg swelling  GASTROINTESTINAL: No abdominal or epigastric pain. No nausea, vomiting, or hematemesis; No diarrhea or constipation. No melena or hematochezia.  GENITOURINARY: No dysuria, frequency, hematuria, or incontinence  NEUROLOGICAL: No headaches, memory loss, loss of strength, numbness, or tremors  SKIN: No itching, burning, rashes, or lesions   LYMPH NODES: No enlarged glands  ENDOCRINE: No heat or cold intolerance; No hair loss  MUSCULOSKELETAL: No joint pain or swelling; No muscle, back, or extremity pain  PSYCHIATRIC: No depression, anxiety, mood swings, or difficulty sleeping  HEME/LYMPH: No easy bruising, or bleeding gums  ALLERY AND IMMUNOLOGIC: No hives or eczema  FAMILY HISTORY:  No pertinent family history in first degree relatives    T(C): 36.2 (09-09-20 @ 15:39), Max: 36.4 (09-08-20 @ 22:15)  HR: 97 (09-09-20 @ 15:39) (87 - 113)  BP: 116/68 (09-09-20 @ 15:39) (108/66 - 130/70)  RR: 18 (09-09-20 @ 15:39) (18 - 20)  SpO2: 95% (09-09-20 @ 05:18) (93% - 96%)  Wt(kg): --Vital Signs Last 24 Hrs  T(C): 36.2 (09 Sep 2020 15:39), Max: 36.4 (08 Sep 2020 22:15)  T(F): 97.2 (09 Sep 2020 15:39), Max: 97.5 (08 Sep 2020 22:15)  HR: 97 (09 Sep 2020 15:39) (87 - 113)  BP: 116/68 (09 Sep 2020 15:39) (108/66 - 130/70)  BP(mean): --  RR: 18 (09 Sep 2020 15:39) (18 - 20)  SpO2: 95% (09 Sep 2020 05:18) (93% - 96%)    PHYSICAL EXAM:  GENERAL: NAD, well-groomed, well-developed  HEAD:  Atraumatic, Normocephalic  EYES: EOMI, PERRLA, conjunctiva and sclera clear  ENMT: No tonsillar erythema, exudates, or enlargement; Moist mucous membranes, Good dentition, No lesions  NECK: Supple, No JVD, Normal thyroid  NERVOUS SYSTEM:  Alert & Oriented X3, Good concentration; Motor Strength 5/5 B/L upper and lower extremities; DTRs 2+ intact and symmetric  PULM: Clear to auscultation bilaterally  CARDIAC: Regular rate and rhythm; No murmurs, rubs, or gallops  GI: Soft, Nontender, Nondistended; Bowel sounds present  EXTREMITIES:  2+ Peripheral Pulses, No clubbing, cyanosis, or edema  LYMPH: No lymphadenopathy noted  SKIN: No rashes or lesions    Consultant(s) Notes Reviewed:  [x ] YES  [ ] NO  Care Discussed with Consultants/Other Providers [ x] YES  [ ] NO    LABS:                            13.1   9.86  )-----------( 243      ( 09 Sep 2020 11:29 )             42.2   09-08    141  |  103  |  30<H>  ----------------------------<  143<H>  4.3   |  21  |  1.5    Ca    9.0      08 Sep 2020 21:20  Mg     1.8     09-09    TPro  6.7  /  Alb  3.6  /  TBili  0.3  /  DBili  x   /  AST  19  /  ALT  7   /  AlkPhos  85  09-08            diltiazem    milliGRAM(s) Oral daily  influenza   Vaccine 0.5 milliLiter(s) IntraMuscular once  rivaroxaban 20 milliGRAM(s) Oral with dinner  triamterene 37.5 mG/hydrochlorothiazide 25 mG Tablet 1 Tablet(s) Oral daily      HEALTH ISSUES - PROBLEM Dx:          Case Discussed with House Staff     Spectra [phone]

## 2020-09-09 NOTE — DISCHARGE NOTE PROVIDER - NSDCHOSPICE_GEN_A_CORE
Please let pt know that the BMP, GFR is wnl.  Her a1c is up from last time.  Try and limit some carbs in diet.    
Pt notified     
No

## 2020-09-09 NOTE — DISCHARGE NOTE PROVIDER - PROVIDER TOKENS
PROVIDER:[TOKEN:[[id number]],FOLLOWUP:[1 week]],PROVIDER:[TOKEN:[[id number]],FOLLOWUP:[1 week]]

## 2020-09-10 LAB
CRP SERPL-MCNC: 1.18 MG/DL — HIGH (ref 0–0.4)
PROCALCITONIN SERPL-MCNC: 0.13 NG/ML — HIGH (ref 0.02–0.1)

## 2020-09-13 LAB
CULTURE RESULTS: SIGNIFICANT CHANGE UP
SPECIMEN SOURCE: SIGNIFICANT CHANGE UP

## 2020-09-14 DIAGNOSIS — I13.0 HYPERTENSIVE HEART AND CHRONIC KIDNEY DISEASE WITH HEART FAILURE AND STAGE 1 THROUGH STAGE 4 CHRONIC KIDNEY DISEASE, OR UNSPECIFIED CHRONIC KIDNEY DISEASE: ICD-10-CM

## 2020-09-14 DIAGNOSIS — Z79.01 LONG TERM (CURRENT) USE OF ANTICOAGULANTS: ICD-10-CM

## 2020-09-14 DIAGNOSIS — N18.3 CHRONIC KIDNEY DISEASE, STAGE 3 (MODERATE): ICD-10-CM

## 2020-09-14 DIAGNOSIS — I50.31 ACUTE DIASTOLIC (CONGESTIVE) HEART FAILURE: ICD-10-CM

## 2020-09-14 DIAGNOSIS — E66.9 OBESITY, UNSPECIFIED: ICD-10-CM

## 2020-09-14 DIAGNOSIS — E87.2 ACIDOSIS: ICD-10-CM

## 2020-09-14 DIAGNOSIS — R73.9 HYPERGLYCEMIA, UNSPECIFIED: ICD-10-CM

## 2020-09-14 DIAGNOSIS — Z88.8 ALLERGY STATUS TO OTHER DRUGS, MEDICAMENTS AND BIOLOGICAL SUBSTANCES: ICD-10-CM

## 2020-09-14 DIAGNOSIS — E87.1 HYPO-OSMOLALITY AND HYPONATREMIA: ICD-10-CM

## 2020-09-14 DIAGNOSIS — Z79.82 LONG TERM (CURRENT) USE OF ASPIRIN: ICD-10-CM

## 2020-09-14 DIAGNOSIS — R06.02 SHORTNESS OF BREATH: ICD-10-CM

## 2020-09-14 DIAGNOSIS — Z95.2 PRESENCE OF PROSTHETIC HEART VALVE: ICD-10-CM

## 2020-09-14 DIAGNOSIS — Z95.5 PRESENCE OF CORONARY ANGIOPLASTY IMPLANT AND GRAFT: ICD-10-CM

## 2020-09-14 DIAGNOSIS — I25.10 ATHEROSCLEROTIC HEART DISEASE OF NATIVE CORONARY ARTERY WITHOUT ANGINA PECTORIS: ICD-10-CM

## 2020-09-14 DIAGNOSIS — I34.0 NONRHEUMATIC MITRAL (VALVE) INSUFFICIENCY: ICD-10-CM

## 2020-09-14 DIAGNOSIS — Z66 DO NOT RESUSCITATE: ICD-10-CM

## 2020-09-14 DIAGNOSIS — Z96.659 PRESENCE OF UNSPECIFIED ARTIFICIAL KNEE JOINT: ICD-10-CM

## 2020-09-14 DIAGNOSIS — I48.20 CHRONIC ATRIAL FIBRILLATION, UNSPECIFIED: ICD-10-CM

## 2020-09-14 DIAGNOSIS — Z90.13 ACQUIRED ABSENCE OF BILATERAL BREASTS AND NIPPLES: ICD-10-CM

## 2020-09-14 DIAGNOSIS — N17.9 ACUTE KIDNEY FAILURE, UNSPECIFIED: ICD-10-CM

## 2020-09-22 ENCOUNTER — INPATIENT (INPATIENT)
Facility: HOSPITAL | Age: 80
LOS: 2 days | Discharge: HOME HEALTH | End: 2020-09-25
Attending: INTERNAL MEDICINE | Admitting: INTERNAL MEDICINE
Payer: MEDICARE

## 2020-09-22 VITALS
OXYGEN SATURATION: 91 % | HEART RATE: 108 BPM | DIASTOLIC BLOOD PRESSURE: 62 MMHG | TEMPERATURE: 98 F | RESPIRATION RATE: 18 BRPM | SYSTOLIC BLOOD PRESSURE: 132 MMHG | HEIGHT: 59 IN

## 2020-09-22 DIAGNOSIS — Z95.2 PRESENCE OF PROSTHETIC HEART VALVE: Chronic | ICD-10-CM

## 2020-09-22 LAB
ALBUMIN SERPL ELPH-MCNC: 3.7 G/DL — SIGNIFICANT CHANGE UP (ref 3.5–5.2)
ALP SERPL-CCNC: 112 U/L — SIGNIFICANT CHANGE UP (ref 30–115)
ALT FLD-CCNC: 7 U/L — SIGNIFICANT CHANGE UP (ref 0–41)
ANION GAP SERPL CALC-SCNC: 13 MMOL/L — SIGNIFICANT CHANGE UP (ref 7–14)
AST SERPL-CCNC: 16 U/L — SIGNIFICANT CHANGE UP (ref 0–41)
BASE EXCESS BLDV CALC-SCNC: 2.6 MMOL/L — HIGH (ref -2–2)
BASOPHILS # BLD AUTO: 0.12 K/UL — SIGNIFICANT CHANGE UP (ref 0–0.2)
BASOPHILS NFR BLD AUTO: 1 % — SIGNIFICANT CHANGE UP (ref 0–1)
BILIRUB SERPL-MCNC: 0.4 MG/DL — SIGNIFICANT CHANGE UP (ref 0.2–1.2)
BUN SERPL-MCNC: 30 MG/DL — HIGH (ref 10–20)
CA-I SERPL-SCNC: 1.19 MMOL/L — SIGNIFICANT CHANGE UP (ref 1.12–1.3)
CALCIUM SERPL-MCNC: 9.1 MG/DL — SIGNIFICANT CHANGE UP (ref 8.5–10.1)
CHLORIDE SERPL-SCNC: 101 MMOL/L — SIGNIFICANT CHANGE UP (ref 98–110)
CO2 SERPL-SCNC: 24 MMOL/L — SIGNIFICANT CHANGE UP (ref 17–32)
CREAT SERPL-MCNC: 1.4 MG/DL — SIGNIFICANT CHANGE UP (ref 0.7–1.5)
EOSINOPHIL # BLD AUTO: 0.09 K/UL — SIGNIFICANT CHANGE UP (ref 0–0.7)
EOSINOPHIL NFR BLD AUTO: 0.8 % — SIGNIFICANT CHANGE UP (ref 0–8)
GAS PNL BLDV: 138 MMOL/L — SIGNIFICANT CHANGE UP (ref 136–145)
GAS PNL BLDV: SIGNIFICANT CHANGE UP
GLUCOSE SERPL-MCNC: 121 MG/DL — HIGH (ref 70–99)
HCO3 BLDV-SCNC: 27 MMOL/L — SIGNIFICANT CHANGE UP (ref 22–29)
HCT VFR BLD CALC: 46.3 % — SIGNIFICANT CHANGE UP (ref 37–47)
HCT VFR BLDA CALC: 44 % — SIGNIFICANT CHANGE UP (ref 34–44)
HGB BLD CALC-MCNC: 14.4 G/DL — SIGNIFICANT CHANGE UP (ref 14–18)
HGB BLD-MCNC: 14.2 G/DL — SIGNIFICANT CHANGE UP (ref 12–16)
IMM GRANULOCYTES NFR BLD AUTO: 0.3 % — SIGNIFICANT CHANGE UP (ref 0.1–0.3)
LACTATE BLDV-MCNC: 1.4 MMOL/L — SIGNIFICANT CHANGE UP (ref 0.5–1.6)
LYMPHOCYTES # BLD AUTO: 19.4 % — LOW (ref 20.5–51.1)
LYMPHOCYTES # BLD AUTO: 2.28 K/UL — SIGNIFICANT CHANGE UP (ref 1.2–3.4)
MCHC RBC-ENTMCNC: 26 PG — LOW (ref 27–31)
MCHC RBC-ENTMCNC: 30.7 G/DL — LOW (ref 32–37)
MCV RBC AUTO: 84.8 FL — SIGNIFICANT CHANGE UP (ref 81–99)
MONOCYTES # BLD AUTO: 1.44 K/UL — HIGH (ref 0.1–0.6)
MONOCYTES NFR BLD AUTO: 12.2 % — HIGH (ref 1.7–9.3)
NEUTROPHILS # BLD AUTO: 7.8 K/UL — HIGH (ref 1.4–6.5)
NEUTROPHILS NFR BLD AUTO: 66.3 % — SIGNIFICANT CHANGE UP (ref 42.2–75.2)
NRBC # BLD: 0 /100 WBCS — SIGNIFICANT CHANGE UP (ref 0–0)
NT-PROBNP SERPL-SCNC: 965 PG/ML — HIGH (ref 0–300)
PCO2 BLDV: 42 MMHG — SIGNIFICANT CHANGE UP (ref 41–51)
PH BLDV: 7.42 — SIGNIFICANT CHANGE UP (ref 7.26–7.43)
PLATELET # BLD AUTO: 288 K/UL — SIGNIFICANT CHANGE UP (ref 130–400)
PO2 BLDV: 45 MMHG — HIGH (ref 20–40)
POTASSIUM BLDV-SCNC: 4.1 MMOL/L — SIGNIFICANT CHANGE UP (ref 3.3–5.6)
POTASSIUM SERPL-MCNC: 4.7 MMOL/L — SIGNIFICANT CHANGE UP (ref 3.5–5)
POTASSIUM SERPL-SCNC: 4.7 MMOL/L — SIGNIFICANT CHANGE UP (ref 3.5–5)
PROT SERPL-MCNC: 7.1 G/DL — SIGNIFICANT CHANGE UP (ref 6–8)
RBC # BLD: 5.46 M/UL — HIGH (ref 4.2–5.4)
RBC # FLD: 14.4 % — SIGNIFICANT CHANGE UP (ref 11.5–14.5)
SAO2 % BLDV: 78 % — SIGNIFICANT CHANGE UP
SODIUM SERPL-SCNC: 138 MMOL/L — SIGNIFICANT CHANGE UP (ref 135–146)
TROPONIN T SERPL-MCNC: <0.01 NG/ML — SIGNIFICANT CHANGE UP
WBC # BLD: 11.77 K/UL — HIGH (ref 4.8–10.8)
WBC # FLD AUTO: 11.77 K/UL — HIGH (ref 4.8–10.8)

## 2020-09-22 PROCEDURE — 93010 ELECTROCARDIOGRAM REPORT: CPT

## 2020-09-22 PROCEDURE — 99223 1ST HOSP IP/OBS HIGH 75: CPT | Mod: AI

## 2020-09-22 PROCEDURE — 99285 EMERGENCY DEPT VISIT HI MDM: CPT

## 2020-09-22 PROCEDURE — 71045 X-RAY EXAM CHEST 1 VIEW: CPT | Mod: 26

## 2020-09-22 RX ORDER — ASPIRIN/CALCIUM CARB/MAGNESIUM 324 MG
81 TABLET ORAL DAILY
Refills: 0 | Status: DISCONTINUED | OUTPATIENT
Start: 2020-09-22 | End: 2020-09-25

## 2020-09-22 RX ORDER — DILTIAZEM HCL 120 MG
180 CAPSULE, EXT RELEASE 24 HR ORAL DAILY
Refills: 0 | Status: DISCONTINUED | OUTPATIENT
Start: 2020-09-22 | End: 2020-09-24

## 2020-09-22 RX ORDER — RIVAROXABAN 15 MG-20MG
20 KIT ORAL DAILY
Refills: 0 | Status: DISCONTINUED | OUTPATIENT
Start: 2020-09-22 | End: 2020-09-23

## 2020-09-22 RX ORDER — DILTIAZEM HCL 120 MG
1 CAPSULE, EXT RELEASE 24 HR ORAL
Qty: 0 | Refills: 0 | DISCHARGE

## 2020-09-22 RX ORDER — FUROSEMIDE 40 MG
20 TABLET ORAL DAILY
Refills: 0 | Status: DISCONTINUED | OUTPATIENT
Start: 2020-09-22 | End: 2020-09-22

## 2020-09-22 RX ORDER — ASPIRIN/CALCIUM CARB/MAGNESIUM 324 MG
162 TABLET ORAL ONCE
Refills: 0 | Status: COMPLETED | OUTPATIENT
Start: 2020-09-22 | End: 2020-09-22

## 2020-09-22 RX ORDER — FUROSEMIDE 40 MG
40 TABLET ORAL ONCE
Refills: 0 | Status: COMPLETED | OUTPATIENT
Start: 2020-09-22 | End: 2020-09-22

## 2020-09-22 RX ORDER — APIXABAN 2.5 MG/1
1 TABLET, FILM COATED ORAL
Qty: 0 | Refills: 0 | DISCHARGE

## 2020-09-22 RX ORDER — TRIAMTERENE/HYDROCHLOROTHIAZID 75 MG-50MG
1 TABLET ORAL DAILY
Refills: 0 | Status: DISCONTINUED | OUTPATIENT
Start: 2020-09-22 | End: 2020-09-23

## 2020-09-22 RX ORDER — ASPIRIN/CALCIUM CARB/MAGNESIUM 324 MG
1 TABLET ORAL
Qty: 0 | Refills: 0 | DISCHARGE

## 2020-09-22 RX ORDER — ATORVASTATIN CALCIUM 80 MG/1
1 TABLET, FILM COATED ORAL
Qty: 0 | Refills: 0 | DISCHARGE

## 2020-09-22 RX ORDER — FUROSEMIDE 40 MG
40 TABLET ORAL DAILY
Refills: 0 | Status: DISCONTINUED | OUTPATIENT
Start: 2020-09-22 | End: 2020-09-25

## 2020-09-22 RX ORDER — RIVAROXABAN 15 MG-20MG
1 KIT ORAL
Qty: 0 | Refills: 0 | DISCHARGE

## 2020-09-22 RX ORDER — ATORVASTATIN CALCIUM 80 MG/1
20 TABLET, FILM COATED ORAL AT BEDTIME
Refills: 0 | Status: DISCONTINUED | OUTPATIENT
Start: 2020-09-22 | End: 2020-09-25

## 2020-09-22 RX ADMIN — Medication 40 MILLIGRAM(S): at 14:47

## 2020-09-22 RX ADMIN — RIVAROXABAN 20 MILLIGRAM(S): KIT at 21:53

## 2020-09-22 RX ADMIN — Medication 162 MILLIGRAM(S): at 13:39

## 2020-09-22 RX ADMIN — ATORVASTATIN CALCIUM 20 MILLIGRAM(S): 80 TABLET, FILM COATED ORAL at 21:53

## 2020-09-22 NOTE — ED PROVIDER NOTE - OBJECTIVE STATEMENT
Patient is a 80 y.o F with PMH of Afib and  left atrial appendage thrombus (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  As per the patient, she climbed a flight of stairs and became winded. SOB worse with exertion. Denies CP, fever, and cough.

## 2020-09-22 NOTE — H&P ADULT - HISTORY OF PRESENT ILLNESS
HPI:    Patient is a 80 y.o F with PMH of Afib and  left atrial appendage thrombus (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  As per the patient, she climbed a flight of stairs and became winded. SOB worse with exertion. Denies CP, fever, and cough.     On admission , the patient was noted to have decreased breath sounds on the R and a pleural effusion on that side.  She walked to the bathroom, became tachypnic and SOB . Therefore , [facility] Given 40 IV lasix, stabilized and then admitted to medicine.     Of note,     Pt was recently admitted and discharged on September 9th after a similar episode . She was admitted for pleural effusion, Acute dyspnea secondary to fluid overload secondary to acute congestive heart failure. She was discharged on lasix 20mg  Q48h   Pt was seen by Dr. Osorio outpatient and was told she needs an ECHO, stress test and possible cath. She has not gotten these yet.

## 2020-09-22 NOTE — ED PROVIDER NOTE - PHYSICAL EXAMINATION
PHYSICAL EXAM:  GENERAL: Obese , NAD, AAO x 4, 80y F  HEAD:  Atraumatic, Normocephalic  EYES: EOMI, conjunctiva clear and sclera white  NECK: Supple, No JVD  CHEST/LUNG: (+) Decreased breath sounds on the R. (+) L rails, No wheeze; No crackles; No accessory muscles used  HEART:  irregular, left sided systolic murmur  ABDOMEN: Soft, Nontender, Nondistended; Bowel sounds present; No guarding  EXTREMITIES:  2+ Peripheral Pulses, No cyanosis or edema  NEUROLOGY: non-focal

## 2020-09-22 NOTE — H&P ADULT - ATTENDING COMMENTS
79 YO F with a PMH of Afib and left atrial appendage thrombus (xarelto), CKD3, CAD s/p PCI w/ stent placement, HFpEF, HTN, aortic stenosis s/p TAVR, and B/L knee replacement who present to the hospital with a c/o progressively worsening SOB over the past x 3 days. Worsened on exertion. + non-productive cough. Takes medications daily. Denies any CP, palpitations, N/V/D, ABD pain, dysuria, or rashes. In the ED, Chest X-ray shows new right-sided pleural effusion. BNP elevated. Started on IV Lasix and BiPAP.     Physical exam shows pt in NAD. VSS, afebrile, not hypoxic on 3L NC. A&Ox3. Non-focal neuro exam. Muscle strength/sensation intact. Crackles noted in B/L lung fields. RRR, no M/G/R. ABD is soft and non-tender, normoactive BSs. B/L LEs with no edema. No rashes. Labs and radiology as above.    Dyspnea due to acute on chronic HFpEF. IV Lasix and transition to PO. Echo. Monitor daily weights, Is&Os, and diet/fluid restriction. BMP in the AM. Cardio consult. Restart home meds.    Hx of Afib and left atrial appendage thrombus (xarelto), CKD3, CAD s/p PCI w/ stent placement, HFpEF, HTN, aortic stenosis s/p TAVR, and B/L knee replacement. Restart home meds. DVT PPX. Inform PCP of pt's admission to hospital. My note supersedes the residents note.

## 2020-09-22 NOTE — ED ADULT NURSE NOTE - OBJECTIVE STATEMENT
pt c/o SOB that started Sunday, worse since then. states she climbed a flight of stairs and became winded. SOB worse with exertion. Denies CP, fever, and cough. PMH CAD with stent, aortic valve replacement.

## 2020-09-22 NOTE — H&P ADULT - NSICDXPASTMEDICALHX_GEN_ALL_CORE_FT
PAST MEDICAL HISTORY:  Aortic valve replaced     Coronary angioplasty status     Coronary stent patent mid lad synergy manjinder [id number]    H/O mastectomy, bilateral     History of total knee replacement, unspecified laterality

## 2020-09-22 NOTE — ED PROVIDER NOTE - NS ED ROS FT
Eyes:  No visual changes, eye pain or discharge.  ENMT:  No hearing changes, pain, no sore throat or runny nose, no difficulty swallowing  Cardiac:  No chest pain, SOB or edema. No chest pain with exertion.  Respiratory:  see HPI  GI:  No nausea, vomiting, diarrhea or abdominal pain.  :  No dysuria, frequency or burning.  MS:  No myalgia, muscle weakness, joint pain or back pain.  Neuro:  No headache or weakness.  No LOC.  Skin:  No skin rash.   Endocrine: No history of thyroid disease or diabetes.

## 2020-09-22 NOTE — ED ADULT NURSE REASSESSMENT NOTE - NS ED NURSE REASSESS COMMENT FT1
pt was tachypniec, as per MD, pt requieres BIPAP and was moved to Crit room 6. Report given to Nurse Ilana. Son at bedside. safety and comfort measures in place. will cont to monitor.

## 2020-09-22 NOTE — H&P ADULT - ASSESSMENT
Assessment:     Patient is a 80 y.o F with PMH of Afib and  left atrial appendage thrombus (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  As per the patient, she climbed a flight of stairs and became winded. SOB worse with exertion. Denies CP, fever, and cough.     On admission , the patient was noted to have decreased breath sounds on the R and a pleural effusion on that side.  She walked to the bathroom, became tachypnic and SOB . Therefore , [facility] Given 40 IV lasix, stabilized and then admitted to medicine.     Of note,     Pt was recently admitted and discharged on September 9th after a similar episode . She was admitted for pleural effusion, Acute dyspnea secondary to fluid overload secondary to acute congestive heart failure. She was discharged on lasix 20mg  Q48h   Pt was seen by Dr. Osorio outpatient and was told she needs an ECHO, stress test and possible cath. She has not gotten these yet.     Plan:    #Acute hypoxic resp failure likely secondary to fluid overload secondary to acute congestive heart failure  secondary to h/o chronic atrial fibrilation   - CXR showed: Stable cardiomegaly. Post TAVR. Right pleural effusion/basilar opacity, mildly increased. Left basilar opacity, unchanged. No pneumothorax.  -clinically euvolemic  -slightly elevated BNP  -Serial CXR - FU AM CXR  -fluid restriction  -increased lasix from home dose - 20mg Q48H to PO lasix 20mg Q24. Increase as necessary while avoiding hypovolemia.    -FU cardiology may need stress test and stent placement as per Dr. Osorio's recent recs.   -FU ECHO  -Bipap PRN     #HTN- stable on home diltiazem, triamterine-hctz, and lasix.  #Hx of CAD SP stent - CW home ASA / Statin    #Aortic stenosis sp TAVR - CW home xarelto   #Hx of LA appendage thrombus - manage as above   #Obesity - BMI 33 , patient counselled on the need for weight loss through diet and exercise.       Activity: AAT , avoiding SOB on exertion  Diet: Dash  DVT ppx: Pt already on xarelto   GI PPX:   Dispo: Home with home care when medically stable  Code STATUS : FULL   Pending : clinical improvement , FU cardio and  TTE (last one was in July 2019)    Assessment:     Patient is a 80 y.o F with PMH of Afib and  left atrial appendage thrombus (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  As per the patient, she climbed a flight of stairs and became winded. SOB worse with exertion. Denies CP, fever, and cough.     On admission , the patient was noted to have decreased breath sounds on the R and a pleural effusion on that side.  She walked to the bathroom, became tachypnic and SOB . Therefore , [facility] Given 40 IV lasix, stabilized and then admitted to medicine.     Of note,     Pt was recently admitted and discharged on September 9th after a similar episode . She was admitted for pleural effusion, Acute dyspnea secondary to fluid overload secondary to acute congestive heart failure. She was discharged on lasix 20mg  Q48h   Pt was seen by Dr. Osorio outpatient and was told she needs an ECHO, stress test and possible cath. She has not gotten these yet.     Plan:    #Acute hypoxic resp failure likely secondary to fluid overload secondary to acute congestive heart failure  secondary to h/o chronic atrial fibrilation   - CXR showed: Stable cardiomegaly. Post TAVR. Right pleural effusion/basilar opacity, mildly increased. Left basilar opacity, unchanged. No pneumothorax.  -clinically euvolemic  -slightly elevated BNP  -Serial CXR - FU AM CXR  -fluid restriction, strict I&O , Daily weights   -increased lasix from home dose - 20mg Q48H to PO lasix 20mg Q24. Increase as necessary while avoiding hypovolemia.    -FU cardiology may need stress test and stent placement as per Dr. Osorio's recent recs.   -FU ECHO  -Bipap PRN     #HTN- stable on home diltiazem, triamterine-hctz, and lasix.  #Hx of CAD SP stent - CW home ASA / Statin    #Aortic stenosis sp TAVR - CW home xarelto   #Hx of LA appendage thrombus - manage as above   #Obesity - BMI 33 , patient counselled on the need for weight loss through diet and exercise.       Activity: AAT , avoiding SOB on exertion  Diet: Dash  DVT ppx: Pt already on xarelto   GI PPX:   Dispo: Home with home care when medically stable  Code STATUS : FULL   Pending : clinical improvement , FU cardio and  TTE (last one was in July 2019)

## 2020-09-22 NOTE — ED PROVIDER NOTE - PROGRESS NOTE DETAILS
Pt ambulated to bathroom and came back with difficulty breathing. Noted to have decreased breath sounds on the R and a pleural effusion on that side. Transferred to crit placed on bipap. Given 40 IV lasix. Admitted to medicine. ATTENDING NOTE: I personally evaluated the patient. I reviewed the Resident’s note (as assigned above), and agree with the findings and plan except as documented in my note. 79 y/o F PMH Afib on Xarelto, CAD with stents, HTN and TVAR now with SOB on exertion over the last 2 days. Pt was recently admitted and discharged on September 9th after a similar evaluation for SOB s/p course of ABX. Pt was seen by Dr. Osorio recently and was told she needs an ECHO, stress test and possible cath. On exam: Elderly female. Non-toxic. (+) Decreased breath sounds on the R. (+) L rails. ABD: Soft, NT/ND. No pedal edema, no JVD. Plan: Labs, imaging, reassess. Progress note Authored by Dr. Guardado: Went to bathroom and experienced an increase in SOB. + Tachypnea. Will place on Bi-pap and admit. ATTENDING NOTE: I personally evaluated the patient. I reviewed the Resident’s note (as assigned above), and agree with the findings and plan except as documented in my note. 81 y/o F PMH Afib on Xarelto, CAD with stents, HTN and TVAR now with SOB on exertion over the last 2 days. Pt was recently admitted and discharged on September 9th after a similar evaluation for SOB s/p course of ABX. Pt was seen by Dr. Bloom  recently and was told she needs an ECHO, stress test and possible cath. On exam: Elderly female. Non-toxic. (+) Decreased breath sounds on the R. (+) L rales. ABD: Soft, NT/ND. No pedal edema, no JVD. Plan: Labs, imaging, reassess.

## 2020-09-23 LAB
ALBUMIN SERPL ELPH-MCNC: 3.6 G/DL — SIGNIFICANT CHANGE UP (ref 3.5–5.2)
ALP SERPL-CCNC: 103 U/L — SIGNIFICANT CHANGE UP (ref 30–115)
ALT FLD-CCNC: 6 U/L — SIGNIFICANT CHANGE UP (ref 0–41)
ANION GAP SERPL CALC-SCNC: 13 MMOL/L — SIGNIFICANT CHANGE UP (ref 7–14)
ANISOCYTOSIS BLD QL: SLIGHT — SIGNIFICANT CHANGE UP
AST SERPL-CCNC: 14 U/L — SIGNIFICANT CHANGE UP (ref 0–41)
BASOPHILS # BLD AUTO: 0 K/UL — SIGNIFICANT CHANGE UP (ref 0–0.2)
BASOPHILS NFR BLD AUTO: 0 % — SIGNIFICANT CHANGE UP (ref 0–1)
BILIRUB SERPL-MCNC: 0.3 MG/DL — SIGNIFICANT CHANGE UP (ref 0.2–1.2)
BUN SERPL-MCNC: 35 MG/DL — HIGH (ref 10–20)
CALCIUM SERPL-MCNC: 9 MG/DL — SIGNIFICANT CHANGE UP (ref 8.5–10.1)
CHLORIDE SERPL-SCNC: 98 MMOL/L — SIGNIFICANT CHANGE UP (ref 98–110)
CO2 SERPL-SCNC: 27 MMOL/L — SIGNIFICANT CHANGE UP (ref 17–32)
CREAT SERPL-MCNC: 1.4 MG/DL — SIGNIFICANT CHANGE UP (ref 0.7–1.5)
EOSINOPHIL # BLD AUTO: 0.3 K/UL — SIGNIFICANT CHANGE UP (ref 0–0.7)
EOSINOPHIL NFR BLD AUTO: 2.6 % — SIGNIFICANT CHANGE UP (ref 0–8)
GIANT PLATELETS BLD QL SMEAR: PRESENT — SIGNIFICANT CHANGE UP
GLUCOSE SERPL-MCNC: 82 MG/DL — SIGNIFICANT CHANGE UP (ref 70–99)
HCT VFR BLD CALC: 42.5 % — SIGNIFICANT CHANGE UP (ref 37–47)
HGB BLD-MCNC: 13 G/DL — SIGNIFICANT CHANGE UP (ref 12–16)
LYMPHOCYTES # BLD AUTO: 1.71 K/UL — SIGNIFICANT CHANGE UP (ref 1.2–3.4)
LYMPHOCYTES # BLD AUTO: 14.9 % — LOW (ref 20.5–51.1)
MAGNESIUM SERPL-MCNC: 2 MG/DL — SIGNIFICANT CHANGE UP (ref 1.8–2.4)
MANUAL SMEAR VERIFICATION: SIGNIFICANT CHANGE UP
MCHC RBC-ENTMCNC: 25.9 PG — LOW (ref 27–31)
MCHC RBC-ENTMCNC: 30.6 G/DL — LOW (ref 32–37)
MCV RBC AUTO: 84.8 FL — SIGNIFICANT CHANGE UP (ref 81–99)
METAMYELOCYTES # FLD: 1.8 % — HIGH (ref 0–0)
MONOCYTES # BLD AUTO: 0.61 K/UL — HIGH (ref 0.1–0.6)
MONOCYTES NFR BLD AUTO: 5.3 % — SIGNIFICANT CHANGE UP (ref 1.7–9.3)
NEUTROPHILS # BLD AUTO: 8.34 K/UL — HIGH (ref 1.4–6.5)
NEUTROPHILS NFR BLD AUTO: 72.8 % — SIGNIFICANT CHANGE UP (ref 42.2–75.2)
OVALOCYTES BLD QL SMEAR: SLIGHT — SIGNIFICANT CHANGE UP
PLAT MORPH BLD: NORMAL — SIGNIFICANT CHANGE UP
PLATELET # BLD AUTO: 242 K/UL — SIGNIFICANT CHANGE UP (ref 130–400)
POIKILOCYTOSIS BLD QL AUTO: SLIGHT — SIGNIFICANT CHANGE UP
POTASSIUM SERPL-MCNC: 4 MMOL/L — SIGNIFICANT CHANGE UP (ref 3.5–5)
POTASSIUM SERPL-SCNC: 4 MMOL/L — SIGNIFICANT CHANGE UP (ref 3.5–5)
PROMYELOCYTES # FLD: 0.9 % — HIGH (ref 0–0)
PROT SERPL-MCNC: 6.5 G/DL — SIGNIFICANT CHANGE UP (ref 6–8)
RBC # BLD: 5.01 M/UL — SIGNIFICANT CHANGE UP (ref 4.2–5.4)
RBC # FLD: 14.2 % — SIGNIFICANT CHANGE UP (ref 11.5–14.5)
RBC BLD AUTO: NORMAL — SIGNIFICANT CHANGE UP
SARS-COV-2 RNA SPEC QL NAA+PROBE: SIGNIFICANT CHANGE UP
SODIUM SERPL-SCNC: 138 MMOL/L — SIGNIFICANT CHANGE UP (ref 135–146)
VARIANT LYMPHS # BLD: 1.7 % — SIGNIFICANT CHANGE UP (ref 0–5)
WBC # BLD: 11.46 K/UL — HIGH (ref 4.8–10.8)
WBC # FLD AUTO: 11.46 K/UL — HIGH (ref 4.8–10.8)

## 2020-09-23 PROCEDURE — 71045 X-RAY EXAM CHEST 1 VIEW: CPT | Mod: 26,77

## 2020-09-23 PROCEDURE — 71045 X-RAY EXAM CHEST 1 VIEW: CPT | Mod: 26

## 2020-09-23 PROCEDURE — 99233 SBSQ HOSP IP/OBS HIGH 50: CPT

## 2020-09-23 RX ORDER — RIVAROXABAN 15 MG-20MG
15 KIT ORAL DAILY
Refills: 0 | Status: DISCONTINUED | OUTPATIENT
Start: 2020-09-23 | End: 2020-09-25

## 2020-09-23 RX ORDER — INFLUENZA VIRUS VACCINE 15; 15; 15; 15 UG/.5ML; UG/.5ML; UG/.5ML; UG/.5ML
0.5 SUSPENSION INTRAMUSCULAR ONCE
Refills: 0 | Status: DISCONTINUED | OUTPATIENT
Start: 2020-09-23 | End: 2020-09-25

## 2020-09-23 RX ADMIN — ATORVASTATIN CALCIUM 20 MILLIGRAM(S): 80 TABLET, FILM COATED ORAL at 21:25

## 2020-09-23 RX ADMIN — Medication 1 TABLET(S): at 05:53

## 2020-09-23 RX ADMIN — RIVAROXABAN 15 MILLIGRAM(S): KIT at 17:06

## 2020-09-23 RX ADMIN — Medication 40 MILLIGRAM(S): at 05:52

## 2020-09-23 RX ADMIN — Medication 81 MILLIGRAM(S): at 12:23

## 2020-09-23 RX ADMIN — Medication 180 MILLIGRAM(S): at 05:51

## 2020-09-23 NOTE — PROGRESS NOTE ADULT - SUBJECTIVE AND OBJECTIVE BOX
BRIAN BARRERA 80y Female  MRN#: [id number]   Hospital Day: 1d    HPI:  HPI:    Patient is a 80 y.o F with PMH of Afib and  left atrial appendage thrombus (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  As per the patient, she climbed a flight of stairs and became winded. SOB worse with exertion. Denies CP, fever, and cough.     On admission , the patient was noted to have decreased breath sounds on the R and a pleural effusion on that side.  She walked to the bathroom, became tachypnic and SOB . Therefore , the patient  was then transferred to crit placed on bipap. Given 40 IV lasix, stabilized and then admitted to medicine.     Of note,     Pt was recently admitted and discharged on September 9th after a similar episode . She was admitted for pleural effusion, Acute dyspnea secondary to fluid overload secondary to acute congestive heart failure. She was discharged on lasix 20mg  Q48h   Pt was seen by Dr. Osorio outpatient and was told she needs an ECHO, stress test and possible cath. She has not gotten these yet.  (22 Sep 2020 14:51)      SUBJECTIVE  Patient is a 80y old Female who presents with a chief complaint of CHF exacerbation (22 Sep 2020 14:51)      INTERVAL HPI AND OVERNIGHT EVENTS:  Patient was examined and seen at bedside. This morning she is resting comfortably in bed and reports no issues or overnight events.      OBJECTIVE  PAST MEDICAL & SURGICAL HISTORY  Coronary stent patent  mid lad synergy manjnider [id number]    History of total knee replacement, unspecified laterality    H/O mastectomy, bilateral    Coronary angioplasty status    Aortic valve replaced    H/O aortic valve replacement      ALLERGIES:  cefazolin (Flushing; Rash; Urticaria)  daptomycin (Flushing; Rash; Urticaria)  doxycycline (Flushing; Rash; Urticaria)  oxacillin (Flushing; Rash; Urticaria)    MEDICATIONS:  STANDING MEDICATIONS  aspirin  chewable 81 milliGRAM(s) Oral daily  atorvastatin 20 milliGRAM(s) Oral at bedtime  diltiazem    milliGRAM(s) Oral daily  furosemide   Injectable 40 milliGRAM(s) IV Push daily  influenza   Vaccine 0.5 milliLiter(s) IntraMuscular once  rivaroxaban 20 milliGRAM(s) Oral daily  triamterene 37.5 mG/hydrochlorothiazide 25 mG Tablet 1 Tablet(s) Oral daily    PRN MEDICATIONS      VITAL SIGNS: Last 24 Hours  T(C): 35.7 (23 Sep 2020 07:30), Max: 36.7 (22 Sep 2020 12:25)  T(F): 96.3 (23 Sep 2020 07:30), Max: 98 (22 Sep 2020 12:25)  HR: 86 (23 Sep 2020 07:30) (73 - 108)  BP: 105/56 (23 Sep 2020 07:30) (105/56 - 132/62)  BP(mean): --  RR: 18 (23 Sep 2020 07:30) (18 - 24)  SpO2: 97% (23 Sep 2020 06:30) (91% - 100%)    LABS:                        14.2   11.77 )-----------( 288      ( 22 Sep 2020 12:45 )             46.3     09-22    138  |  101  |  30<H>  ----------------------------<  121<H>  4.7   |  24  |  1.4    Ca    9.1      22 Sep 2020 12:45    TPro  7.1  /  Alb  3.7  /  TBili  0.4  /  DBili  x   /  AST  16  /  ALT  7   /  AlkPhos  112  09-22          Troponin T, Serum: <0.01 ng/mL (09-22-20 @ 12:45)      CARDIAC MARKERS ( 22 Sep 2020 12:45 )  x     / <0.01 ng/mL / x     / x     / x          PHYSICAL EXAM:  CONSTITUTIONAL: No acute distress, well-developed, well-groomed, AAOx3  HEAD: Atraumatic, normocephalic  EYES: EOM intact, PERRLA, conjunctiva and sclera clear  ENT: Supple, no masses, no thyromegaly, no bruits, no JVD; moist mucous membranes  PULMONARY: Clear to auscultation bilaterally; no wheezes, rales, or rhonchi  CARDIOVASCULAR: Regular rate and rhythm; no murmurs, rubs, or gallops  GASTROINTESTINAL: Soft, non-tender, non-distended; bowel sounds present  MUSCULOSKELETAL: 1+ LE edema  NEUROLOGY: non-focal  SKIN: No rashes or lesions; warm and dry

## 2020-09-23 NOTE — SBIRT NOTE ADULT - NSSBIRTALCNOACTINTDET_GEN_A_CORE
Patient reported that she drinks one glass of wine with dinner almost every evening. Patient reported she never drinks more than the one glass each night. Patient denies any dependence or need for resources/referrals at this time.

## 2020-09-23 NOTE — DISCHARGE NOTE NURSING/CASE MANAGEMENT/SOCIAL WORK - PATIENT PORTAL LINK FT
You can access the FollowMyHealth Patient Portal offered by [facility] by registering at the following website: [email or web address]. By joining [employer]’s FollowMyHealth portal, you will also be able to view your health information using other applications (apps) compatible with our system.

## 2020-09-23 NOTE — PATIENT PROFILE ADULT - FALL HARM RISK TYPE OF ASSESSMENT
Pre-Operative Diagnosis: high grade right carotid stenosis     Post-Operative Diagnosis: same     Procedure Performed:   1.  Right carotid endarterectomy with bovine patch and granda shunt - high dissection    Surgeon(s) and Role:     * Francesco Cole, calcified. Hypoglossal nerve was identified and mobilized. To get adequate distal exposure the posterior belly of the digastric had to be divided. 2 branches of the external carotid were divided as well as multiple venous branches.   Eventually we had di through the internal carotid confirmed with Doppler. Heparin was reversed with protamine. Incision was inspected for hemostasis and hemostasis was excellent.   Incision was then closed with interrupted Vicryl to reapproximate the platysmal and Monocryl fo admission

## 2020-09-23 NOTE — PHYSICAL THERAPY INITIAL EVALUATION ADULT - GENERAL OBSERVATIONS, REHAB EVAL
Chart reviewed. PT eval 07:30-08:00am. Pt seen semi-reclined in bed. in NAD. + IV lock, + primafit, + O2 via n/c. Pt willing to participate in PT session. SpO2 92% on supplemental O2

## 2020-09-23 NOTE — PHYSICAL THERAPY INITIAL EVALUATION ADULT - PERTINENT HX OF CURRENT PROBLEM, REHAB EVAL
Patient is a 80 y.o F with PMH of Afib and  left atrial appendage thrombus (X1 year)  on xarelto, CAD sp stent in the LAD, HFpEF , HTN, and aortic stenosis sp TAVR ,  and BL knee replacement presents with worsening SOB and dry cough on minimal exertion

## 2020-09-23 NOTE — PHARMACOTHERAPY INTERVENTION NOTE - COMMENTS
Patient currently treated with Xarelto 20mg daily with food for Afib. Dose adjustment is warranted to 15mg daily with food based on patient GFR 35 ml/min/m2 (according to Xarelto package insert renal dose adjustment for Afib when CrCl </= 50ml/min to 15mg daily with food). Discussed with resident MD on rounds.

## 2020-09-23 NOTE — DISCHARGE NOTE NURSING/CASE MANAGEMENT/SOCIAL WORK - NSDCPEXARELTOREACT_GEN_ALL_CORE
Detail Level: Detailed
Urine Pregnancy Test Result: negative
Rivaroxaban/Xarelto increases your risk for bleeding. Notify your doctor if you experience any of the following side effects: unusual bleeding or bruising, vomiting blood or coffee ground-like material, red or black stool, itching or hives, chest tightness, trouble breathing, swelling in your face or hands, swelling in your mouth or throat, change in how much or how often you urinate, red or brown urine, heavy menstrual or vaginal bleeding, or blistering or peeling skin. When Rivaroxaban/Xarelto is taken with other medicines, they can affect how it works. Taking other medications such as aspirin, antibiotics, antifungals, blood thinners, nonsteroidal anti-inflammatories, and medications that treat depression can increase your risk of bleeding. It is very important to tell your health care provider about all of the other medicines, including over-the-counter medications, herbs, and vitamins you are taking.  DO NOT start, stop, or change the dosage of any medicine, including over-the-counter medicines, vitamins, and herbal products without your doctor’s approval.  Any products containing aspirin or are nonsteroidal anti-inflammatories lessen the blood’s ability to form clots and adds to the effect of Rivaroxaban/Xarelto. Never take aspirin or medicines that contain aspirin without speaking to your doctor.

## 2020-09-23 NOTE — PROGRESS NOTE ADULT - ATTENDING COMMENTS
patient seen and examined independently on morning rounds for the first time today, chart reviewed and discussed with the medicine resident and agree with the above resident progress note with the following addendum:    in brief, 79 yo woman with h/o AF/LA thrombus (on Xarelto), CAD s/p pci, HTN and AS s/p TAVR who p/w worsening cough, sob.  She was recently admitted (9/9/20) for similar episode- discharged on oral lasix and with f/u (outpatient pcp Dr. Crook and cardio Dr. Mendoza)    PE:  GEN-NAD, AAOx3  PULM- , fair air entry decreased bs bilateral bases  CVS- +s1/s2 RRR no murmurs  GI- soft NT obese ND +bs, no rebound, no guarding  EXT- 1+ edema    remains on o2 suppl (via NC)---continue to monitor o2 sat and suppl- awaiting Echo- continue tele monitoring and diuresis with lasix 20 mg iv daily --monitor i/o and daily weights-

## 2020-09-23 NOTE — CONSULT NOTE ADULT - SUBJECTIVE AND OBJECTIVE BOX
Patient was seen and examined by me earlier today on 4B.  EMR reviewed.    Patient is a 80y old  Female who presents with a chief complaint of CHF exacerbation (23 Sep 2020 09:12)      REASON FOR CONSULT: CHF      HPI:  Ms. 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  As per the patient, she climbed a flight of stairs and became winded. SOB worse with exertion. Denies CP, fever, and cough.     On admission , the patient was noted to have decreased breath sounds on the R and a pleural effusion on that side.  She walked to the bathroom, became tachypnic and SOB . Therefore , the patient  was then transferred to crit placed on bipap. Given 40 IV lasix, stabilized and then admitted to medicine.     Of note,     Pt was recently admitted and discharged on September 9th after a similar episode . She was admitted for pleural effusion, Acute dyspnea secondary to fluid overload secondary to acute congestive heart failure. She was discharged on lasix 20mg  Q48h   Pt was seen by Dr. Osorio outpatient and was told she needs an ECHO, stress test and possible cath. She has not gotten these yet.  (22 Sep 2020 14:51)      PAST MEDICAL & SURGICAL HISTORY:  Coronary stent patent  mid lad synergy manjinder 3.5x38mm    History of total knee replacement, unspecified laterality    H/O mastectomy, bilateral    Coronary angioplasty status    Aortic valve replaced    H/O aortic valve replacement            SOCIAL HISTORY:     FAMILY HISTORY:  No pertinent family history in first degree relatives      cefazolin (Flushing; Rash; Urticaria)  daptomycin (Flushing; Rash; Urticaria)  doxycycline (Flushing; Rash; Urticaria)  oxacillin (Flushing; Rash; Urticaria)      MEDICATIONS  (STANDING):  aspirin  chewable 81 milliGRAM(s) Oral daily  atorvastatin 20 milliGRAM(s) Oral at bedtime  diltiazem    milliGRAM(s) Oral daily  furosemide   Injectable 40 milliGRAM(s) IV Push daily  influenza   Vaccine 0.5 milliLiter(s) IntraMuscular once  rivaroxaban 15 milliGRAM(s) Oral daily  triamterene 37.5 mG/hydrochlorothiazide 25 mG Tablet 1 Tablet(s) Oral daily    MEDICATIONS  (PRN):      Vital Signs Last 24 Hrs  T(C): 35.7 (23 Sep 2020 07:30), Max: 36.7 (22 Sep 2020 12:25)  T(F): 96.3 (23 Sep 2020 07:30), Max: 98 (22 Sep 2020 12:25)  HR: 86 (23 Sep 2020 07:30) (73 - 108)  BP: 105/56 (23 Sep 2020 07:30) (105/56 - 132/62)  BP(mean): --  RR: 18 (23 Sep 2020 07:30) (18 - 24)  SpO2: 97% (23 Sep 2020 06:30) (91% - 100%) I&O's Detail    22 Sep 2020 07:01  -  23 Sep 2020 07:00  --------------------------------------------------------  IN:  Total IN: 0 mL    OUT:    Voided (mL): 300 mL  Total OUT: 300 mL    Total NET: -300 mL        PHYSICAL EXAM:      Constitutional: appears stated age, well developed/nourished, no acute distress    Eyes: EOM's intact.  PERRLA    ENMT: Normocepahic, atraumatic.  Clear oropharynx.  No ear discharge.    Neck: Jugular veins non-distended; no carotid bruits bilaterally.    Breasts: No gross abnormalities noted.    Respiratory: respiratory pattern unlabored; no dullness to percussion; lungs clear to asuculatation bilaterally.    Cardiovascular: Regular rhythm.  S1 and S2 normal.  No murmur nor rub appreciated.    Abdomen: Soft, non-tender.  Normal bowel sounds.    Extremities: extremities warm; no cyanosis, clubbing or edema.    Pulses: Intact bilaterally    Skin: No gross abnormalities noted.    Musculoskeletal: No gross deformities    Neurological: Alert, oriented x 3.  No focal neurologic deficits noted.  REVIEW OF SYSTEMS      CONSTITUTIONAL:  No night sweats.  No fatigue, malaise, lethargy.  No fever or chills.    HEENT:  Eyes:  No visual changes.  No eye pain.  No eye discharge.  ENT:  No runny nose.  No epistaxis.  No sinus pain.  No sore throat.  No odynophagia.  No ear pain.  No congestion.    BREASTS:  No breast pain, soreness, lumps, or discharge.    RESPIRATORY:  No cough.  No wheeze.  No hemoptysis.  No shortness of breath.    CARDIOVASCULAR:  No chest pains.  No palpitations.     GASTROINTESTINAL:  No abdominal pain.  No nausea or vomiting.  No diarrhea or constipation.  No hematemesis.  No hematochezia.  No melena.    GENITOURINARY:  No urgency.  No frequency.  No dysuria.  No hematuria.  No obstructive symptoms.  No discharge.  No pain.  No significant abnormal bleeding.    MUSCULOSKELETAL:  No musculoskeletal pain.  No joint swelling.  No arthritis.    NEUROLOGICAL:    No headache or neck pain.  No syncope or seizure. no weakness . No Vertigo.    SKIN:  No rashes.  No lesions.  No wounds.    ENDOCRINE:  No unexplained weight loss.  No polydipsia.  No polyuria.  No polyphagia.    HEMATOLOGIC:  No anemia.  No purpura.  No petechiae.  No prolonged or excessive bleeding.     ALLERGIC AND IMMUNOLOGIC:  No pruritus.  No swelling.       ECG:  ECHOCARDIOGRAM:  RADIOLOGY & ADDITIONAL STUDIES:      LABS:                        14.2   11.77 )-----------( 288      ( 22 Sep 2020 12:45 )             46.3     09-22    138  |  101  |  30<H>  ----------------------------<  121<H>  4.7   |  24  |  1.4    Ca    9.1      22 Sep 2020 12:45    TPro  7.1  /  Alb  3.7  /  TBili  0.4  /  DBili  x   /  AST  16  /  ALT  7   /  AlkPhos  112  09-22    CARDIAC MARKERS ( 22 Sep 2020 12:45 )  x     / <0.01 ng/mL / x     / x     / x            I&O's Summary    22 Sep 2020 07:01  -  23 Sep 2020 07:00  --------------------------------------------------------  IN: 0 mL / OUT: 300 mL / NET: -300 mL      BNPSerum Pro-Brain Natriuretic Peptide: 965 pg/mL (09-22 @ 12:45)      ASSESMENT AND PLAN    Patient was seen and examined by me earlier today on 4B.  EMR reviewed.    Patient is a 80y old  Female who presents with a chief complaint of CHF exacerbation (23 Sep 2020 09:12)      REASON FOR CONSULT: CHF      HPI:  Ms. José Miguel Mueller is an 80-year-old  woman with a past medical history of CAD S/P PTCA/Stent [12-: mLAD, Synergy MANJINDER), Hypertension, AS S/P [id number]-: 23 mm Juhi-3 Valve), Paroxysmal ATrial Fibrillation, Mitral Valve Regurgitation, Diastolic CHF,  Bilateral Breast Cancer S/P Bilateral Mastectomy, Osteoarthritis of Kees S/P Right TKR, Hyperlipidemia and Obesity.    She presented at [facility] because of worsening shortness of breath. She states that she has had more pronounced shortness of breath with her activities of daily living.  She denies any chest pain at rest or exertion.  On 4B, she appears comfortable in bed.  As per House Staff, she had shortness of breath with oxygen desaturation while she walked on the floor this morning.  ______________________________________________________________    House Staff Admission Notes  Patient is a 80 y.o F with PMH of Afib and  left atrial appendage thrombus (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  As per the patient, she climbed a flight of stairs and became winded. SOB worse with exertion. Denies CP, fever, and cough.     On admission , the patient was noted to have decreased breath sounds on the R and a pleural effusion on that side.  She walked to the bathroom, became tachypnic and SOB . Therefore , [facility] Given 40 IV lasix, stabilized and then admitted to medicine.     Of note,     Pt was recently admitted and discharged on September 9th after a similar episode . She was admitted for pleural effusion, Acute dyspnea secondary to fluid overload secondary to acute congestive heart failure. She was discharged on lasix 20mg  Q48h   Pt was seen by Dr. Osorio outpatient and was told she needs an ECHO, stress test and possible cath. She has not gotten these yet.  (22 Sep 2020 14:51)      PAST MEDICAL & SURGICAL HISTORY:  Coronary stent patent  mid lad synergy manjinder 3.5x38mm  History of total knee replacement, unspecified laterality  H/O mastectomy, bilateral  Coronary angioplasty status  Aortic valve replaced  H/O aortic valve replacement (TAVR)                SOCIAL HISTORY: 10 pack years smoking history; quit about 19 years ago.    FAMILY HISTORY:  CAD/MI - Father  DM - Father      cefazolin (Flushing; Rash; Urticaria)  daptomycin (Flushing; Rash; Urticaria)  doxycycline (Flushing; Rash; Urticaria)  oxacillin (Flushing; Rash; Urticaria)      MEDICATIONS  (STANDING):  aspirin  chewable 81 milliGRAM(s) Oral daily  atorvastatin 20 milliGRAM(s) Oral at bedtime  diltiazem    milliGRAM(s) Oral daily  furosemide   Injectable 40 milliGRAM(s) IV Push daily  influenza   Vaccine 0.5 milliLiter(s) IntraMuscular once  rivaroxaban 15 milliGRAM(s) Oral daily  triamterene 37.5 mG/hydrochlorothiazide 25 mG Tablet 1 Tablet(s) Oral daily    MEDICATIONS  (PRN):      Vital Signs Last 24 Hrs  T(C): 35.7 (23 Sep 2020 07:30), Max: 36.7 (22 Sep 2020 12:25)  T(F): 96.3 (23 Sep 2020 07:30), Max: 98 (22 Sep 2020 12:25)  HR: 86 (23 Sep 2020 07:30) (73 - 108)  BP: 105/56 (23 Sep 2020 07:30) (105/56 - 132/62)  BP(mean): --  RR: 18 (23 Sep 2020 07:30) (18 - 24)  SpO2: 97% (23 Sep 2020 06:30) (91% - 100%) I&O's Detail    22 Sep 2020 07:01  -  23 Sep 2020 07:00  --------------------------------------------------------  IN:  Total IN: 0 mL    OUT:    Voided (mL): 300 mL  Total OUT: 300 mL    Total NET: -300 mL        PHYSICAL EXAM:  Not in apparent distress  Normocephalic; atraumatic  Alert, oriented x 3  Irregular rhythm; nl S1S2  Bibasilar crackles noted  Abdomen soft, no guarding  No edema in both legs  No focal lateralizing neurologic deficits.      REVIEW OF SYSTEMS: Negative except as stated in HPI.    ECG: Atrial Fib with RVR    ECHOCARDIOGRAM: pending    RADIOLOGY & ADDITIONAL STUDIES:  CXR  < from: Xray Chest 1 View- PORTABLE-Urgent (Xray Chest 1 View- PORTABLE-Urgent .) (09.23.20 @ 10:03) >  FINDINGS:    SUPPORT DEVICES: Status post TAVR    CARDIAC/MEDIASTINUM/HILUM: Enlarged but unchanged cardiac silhouette.    LUNG PARENCHYMA/PLEURA: Improving right-sided pleural effusion. Hazy airspace linear opacities within the right lower and midlung. No large pneumothorax..    SKELETON/SOFT TISSUES: Bilateral shoulder arthropathy.      IMPRESSION:    Improving right-sided pleural effusion. Right lower/mid lung airspace opacities.      < end of copied text >    < from: Xray Chest 1 View- PORTABLE-Routine (Xray Chest 1 View- PORTABLE-Routine in AM.) (09.23.20 @ 03:15) >  Findings:    Support devices: None.    Cardiac/mediastinum/hilum: Obscured and not well evaluated. Post TAVR.    Lung parenchyma/Pleura: Increased bilateral opacities, right greater than left and unchanged right pleural effusion. No pneumothorax.    Skeleton/soft tissues: Stable.    Impression:    Increased bilateral opacities, right greater than left and unchanged right pleural effusion.        < end of copied text >          LABS:                        14.2   11.77 )-----------( 288      ( 22 Sep 2020 12:45 )             46.3     09-22    138  |  101  |  30<H>  ----------------------------<  121<H>  4.7   |  24  |  1.4    Ca    9.1      22 Sep 2020 12:45    TPro  7.1  /  Alb  3.7  /  TBili  0.4  /  DBili  x   /  AST  16  /  ALT  7   /  AlkPhos  112  09-22    CARDIAC MARKERS ( 22 Sep 2020 12:45 )  x     / <0.01 ng/mL / x     / x     / x            I&O's Summary    22 Sep 2020 07:01  -  23 Sep 2020 07:00  --------------------------------------------------------  IN: 0 mL / OUT: 300 mL / NET: -300 mL      BNPSerum Pro-Brain Natriuretic Peptide: 965 pg/mL (09-22 @ 12:45)

## 2020-09-23 NOTE — CONSULT NOTE ADULT - ASSESSMENT
1] Diastolic CHF, acute on chronic      Bilateral Pleural Effusion  - Continue diuretic therapy with IV Furosemide and stop Triamterene-HCT  - Follow BMP and supplement K level.  Keep K > 4 and Mg >2.  - Repeat CXR    2] CAD S/P PTCA/Stent (mLAD). 1] Diastolic CHF, acute on chronic      Bilateral Pleural Effusion  - Continue diuretic therapy with IV Furosemide and stop Triamterene-HCT  - Follow BMP and supplement K level.  Keep K > 4 and Mg >2.  - Repeat CXR  - Daily weight and chart    2] CAD S/P PTCA/Stent (mLAD)  - Continue Aspirin 81 mg tablet daily    3] AS S/P TAVR  - Follow up on echo for LVEF and status of prosthetic AV and mitral valve  - On Aspirin 81 mg tablet daily    4] Paroxysmal Atrial Fibrillation  - Decrease dose of Xarelto to 15 mg tablet daily with dinner (Age > 80; and GFR < 60)  - On Diltiazem 180 mg CD cap daily for rate control    5] Hypertension  - Discontinue Triamterene-HCT  - Continue Furosemide and Diltiazem 180 mg CD cap daily    6] Chronic Kidney Disease Stage III  - Continue to monitor Creatinine    Plan discussed with House Staff on the floor.  Plan discussed with patient at bedside.    León Bloom MD  [phone] Office

## 2020-09-23 NOTE — PROGRESS NOTE ADULT - ASSESSMENT
80 y.o F with PMH of A.fib and left atrial appendage thrombus (X1 year) on xarelto, CAD s/p stent in the LAD, HFpEF , HTN, and aortic stenosis s/p TAVR , b/l knee replacement presented with worsening SOB and dry cough on minimal exertion   Pt was recently admitted and discharged on September 9th after a similar episode . She was admitted for pleural effusion, Acute dyspnea secondary to fluid overload secondary to acute congestive heart failure.    #Dyspnea due to Acute on Chronic HFpEF; Improved   - SOB +ve. Orthopnea +ve.   - Troponin -ve x 1  - Chest X-ray showing Right pleural effusion/basilar opacity, mildly increased. Unchanged left basilar opacity. Will repeat X-ray   - Physical Exam: 1+ b/l LE edema. JVD +ve  - . Around baseline compared to last admission   - Pending echo.   - Monitor O2 Sat. Keep > 93%. Supplement as needed. BiPaP PRN  - Patient desaturated to 86% on ambulation without oxygen. Will need home oxygen   - Daily weight. Strict I & Os. Keep I < O. Fluid restriction  - Pending Cardiology follow up. Dr Bloom  - Continue lasix 40mg IV QD for now. Will discharge on cardio recommendations for lasix        #HTN - Continue diltiazem 180mg PO QD, triamterine-hctz 37.5/25 mg, and lasix 40mg IV QD  #Hx of CAD S/P stent in LAD - Continue ASA / Statin    #Hx of Aortic stenosis s/p TAVR - Continue xarelto 20mg PO QD  #Hx of LA appendage thrombus - Continue xarelto 20mg PO QD  #Obesity - BMI 33 , patient counselled on the need for weight loss through diet and exercise.     #Misc  - DVT Prophylaxis: Xeralto  - Diet: DASH/TLC  - GI Prophylaxis: Pantoprazole   - Activity: AAT  - Code Status: Full Code    Dispo: Pending Cardio follow up 80 y.o F with PMH of A.fib and left atrial appendage thrombus on xarelto, CAD s/p stent in the LAD, HFpEF, HTN, aortic stenosis s/p TAVR , b/l knee replacement presented with worsening SOB and dry cough on minimal exertion   Pt was recently admitted and discharged on September 9th after a similar episode. She was admitted for pleural effusion, Acute dyspnea secondary to fluid overload secondary to acute congestive heart failure.    #Dyspnea due to Acute on Chronic HFpEF; Improved   - SOB +ve. Orthopnea +ve.   - Troponin -ve x 1  - Chest X-ray showing Right pleural effusion/basilar opacity, mildly increased. Unchanged left basilar opacity. Will repeat X-ray   - Physical Exam: 1+ b/l LE edema. JVD +ve  - . Around baseline compared to last admission   - Pending echo.   - Monitor O2 Sat. Keep > 93%. Supplement as needed. BiPaP PRN  - Patient desaturated to 86% on ambulation without oxygen. Might need home oxygen. Patient not on home oxygen at baseline.    - Daily weight. Strict I & Os. Keep I < O. Fluid restriction  - Patient reports being complaint with Diet, fluid restriction and medications at baseline   - Pending Cardiology follow up. Dr Bloom  - Continue lasix 40mg IV QD for now. Will discharge on cardio recommendations for lasix      #CKD III - Continue to monitor. Cr downtrending   #HTN - Continue diltiazem 180mg PO QD, triamterine-hctz 37.5/25 mg, and lasix 40mg IV QD  #Hx of CAD S/P stent in LAD - Continue ASA / Statin    #Hx of Aortic stenosis s/p TAVR - Continue xarelto 20mg PO QD  #Hx of LA appendage thrombus - Continue xarelto 20mg PO QD  #Obesity - BMI 33 , patient counselled on the need for weight loss through diet and exercise.     #Misc  - DVT Prophylaxis: Xeralto  - Diet: DASH/TLC  - GI Prophylaxis: Pantoprazole   - Activity: AAT  - Code Status: Full Code    Dispo: Pending Cardio follow up. Echo. Chest X-ray. 80 y.o F with PMH of A.fib and left atrial appendage thrombus on xarelto, CAD s/p stent in the LAD, HFpEF, HTN, aortic stenosis s/p TAVR , b/l knee replacement presented with worsening SOB and dry cough on minimal exertion   Pt was recently admitted and discharged on September 9th after a similar episode. She was admitted for pleural effusion, Acute dyspnea secondary to fluid overload secondary to acute congestive heart failure.    #Dyspnea due to Acute on Chronic HFpEF; Improved   - SOB +ve. Orthopnea +ve.   - Troponin -ve x 1  - Chest X-ray showing Right pleural effusion/basilar opacity, mildly increased. Unchanged left basilar opacity. Will repeat X-ray   - Physical Exam: 1+ b/l LE edema. JVD +ve  - . Around baseline compared to last admission   - Pending echo.   - Monitor O2 Sat. Keep > 93%. Supplement as needed. BiPaP PRN  - Patient desaturated to 86% on ambulation without oxygen. Might need home oxygen. Patient not on home oxygen at baseline.    - Daily weight. Strict I & Os. Keep I < O. Fluid restriction  - Patient reports being complaint with Diet, fluid restriction and medications at baseline   - Pending Cardiology follow up. Dr Bloom  - Continue lasix 40mg IV QD for now. Will discharge on cardio recommendations for lasix      #CKD III - Continue to monitor. Cr downtrending   #HTN - Continue diltiazem 180mg PO QD and lasix 40mg IV QD  #Hx of CAD S/P stent in LAD - Continue ASA / Statin    #Hx of Aortic stenosis s/p TAVR - Continue xarelto 15mg PO QD  #Hx of LA appendage thrombus - Continue xarelto 15mg PO QD  #Obesity - BMI 33, patient counselled on the need for weight loss through diet and exercise.     #Misc  - DVT Prophylaxis: Xeralto  - Diet: DASH/TLC  - GI Prophylaxis: Pantoprazole   - Activity: AAT  - Code Status: Full Code    Dispo: Pending Cardio follow up. Echo. Chest X-ray.

## 2020-09-23 NOTE — PHYSICAL THERAPY INITIAL EVALUATION ADULT - PLANNED THERAPY INTERVENTIONS, PT EVAL
balance training/bed mobility training/transfer training/gait training/strengthening/stair negotiation.

## 2020-09-24 LAB
ALBUMIN SERPL ELPH-MCNC: 3.4 G/DL — LOW (ref 3.5–5.2)
ALP SERPL-CCNC: 96 U/L — SIGNIFICANT CHANGE UP (ref 30–115)
ALT FLD-CCNC: 7 U/L — SIGNIFICANT CHANGE UP (ref 0–41)
ANION GAP SERPL CALC-SCNC: 13 MMOL/L — SIGNIFICANT CHANGE UP (ref 7–14)
AST SERPL-CCNC: 23 U/L — SIGNIFICANT CHANGE UP (ref 0–41)
BASOPHILS # BLD AUTO: 0.09 K/UL — SIGNIFICANT CHANGE UP (ref 0–0.2)
BASOPHILS NFR BLD AUTO: 0.8 % — SIGNIFICANT CHANGE UP (ref 0–1)
BILIRUB SERPL-MCNC: 0.4 MG/DL — SIGNIFICANT CHANGE UP (ref 0.2–1.2)
BUN SERPL-MCNC: 37 MG/DL — HIGH (ref 10–20)
CALCIUM SERPL-MCNC: 9 MG/DL — SIGNIFICANT CHANGE UP (ref 8.5–10.1)
CHLORIDE SERPL-SCNC: 97 MMOL/L — LOW (ref 98–110)
CO2 SERPL-SCNC: 27 MMOL/L — SIGNIFICANT CHANGE UP (ref 17–32)
CREAT SERPL-MCNC: 1.4 MG/DL — SIGNIFICANT CHANGE UP (ref 0.7–1.5)
EOSINOPHIL # BLD AUTO: 0.17 K/UL — SIGNIFICANT CHANGE UP (ref 0–0.7)
EOSINOPHIL NFR BLD AUTO: 1.6 % — SIGNIFICANT CHANGE UP (ref 0–8)
GLUCOSE SERPL-MCNC: 113 MG/DL — HIGH (ref 70–99)
HCT VFR BLD CALC: 42.8 % — SIGNIFICANT CHANGE UP (ref 37–47)
HGB BLD-MCNC: 13.5 G/DL — SIGNIFICANT CHANGE UP (ref 12–16)
IMM GRANULOCYTES NFR BLD AUTO: 0.4 % — HIGH (ref 0.1–0.3)
LYMPHOCYTES # BLD AUTO: 1.66 K/UL — SIGNIFICANT CHANGE UP (ref 1.2–3.4)
LYMPHOCYTES # BLD AUTO: 15.6 % — LOW (ref 20.5–51.1)
MAGNESIUM SERPL-MCNC: 2 MG/DL — SIGNIFICANT CHANGE UP (ref 1.8–2.4)
MCHC RBC-ENTMCNC: 26.5 PG — LOW (ref 27–31)
MCHC RBC-ENTMCNC: 31.5 G/DL — LOW (ref 32–37)
MCV RBC AUTO: 84.1 FL — SIGNIFICANT CHANGE UP (ref 81–99)
MONOCYTES # BLD AUTO: 1.1 K/UL — HIGH (ref 0.1–0.6)
MONOCYTES NFR BLD AUTO: 10.3 % — HIGH (ref 1.7–9.3)
NEUTROPHILS # BLD AUTO: 7.58 K/UL — HIGH (ref 1.4–6.5)
NEUTROPHILS NFR BLD AUTO: 71.3 % — SIGNIFICANT CHANGE UP (ref 42.2–75.2)
NRBC # BLD: 0 /100 WBCS — SIGNIFICANT CHANGE UP (ref 0–0)
PHOSPHATE SERPL-MCNC: 4.5 MG/DL — SIGNIFICANT CHANGE UP (ref 2.1–4.9)
PLATELET # BLD AUTO: 207 K/UL — SIGNIFICANT CHANGE UP (ref 130–400)
POTASSIUM SERPL-MCNC: 4.5 MMOL/L — SIGNIFICANT CHANGE UP (ref 3.5–5)
POTASSIUM SERPL-SCNC: 4.5 MMOL/L — SIGNIFICANT CHANGE UP (ref 3.5–5)
PROT SERPL-MCNC: 6.8 G/DL — SIGNIFICANT CHANGE UP (ref 6–8)
RBC # BLD: 5.09 M/UL — SIGNIFICANT CHANGE UP (ref 4.2–5.4)
RBC # FLD: 14.2 % — SIGNIFICANT CHANGE UP (ref 11.5–14.5)
SODIUM SERPL-SCNC: 137 MMOL/L — SIGNIFICANT CHANGE UP (ref 135–146)
TROPONIN T SERPL-MCNC: <0.01 NG/ML — SIGNIFICANT CHANGE UP
WBC # BLD: 10.64 K/UL — SIGNIFICANT CHANGE UP (ref 4.8–10.8)
WBC # FLD AUTO: 10.64 K/UL — SIGNIFICANT CHANGE UP (ref 4.8–10.8)

## 2020-09-24 PROCEDURE — 99233 SBSQ HOSP IP/OBS HIGH 50: CPT

## 2020-09-24 RX ORDER — ADENOSINE 3 MG/ML
60 INJECTION INTRAVENOUS ONCE
Refills: 0 | Status: DISCONTINUED | OUTPATIENT
Start: 2020-09-24 | End: 2020-09-25

## 2020-09-24 RX ORDER — DILTIAZEM HCL 120 MG
180 CAPSULE, EXT RELEASE 24 HR ORAL DAILY
Refills: 0 | Status: DISCONTINUED | OUTPATIENT
Start: 2020-09-24 | End: 2020-09-25

## 2020-09-24 RX ADMIN — RIVAROXABAN 15 MILLIGRAM(S): KIT at 18:18

## 2020-09-24 RX ADMIN — Medication 180 MILLIGRAM(S): at 13:32

## 2020-09-24 RX ADMIN — ATORVASTATIN CALCIUM 20 MILLIGRAM(S): 80 TABLET, FILM COATED ORAL at 21:41

## 2020-09-24 RX ADMIN — Medication 40 MILLIGRAM(S): at 05:24

## 2020-09-24 RX ADMIN — Medication 81 MILLIGRAM(S): at 13:32

## 2020-09-24 NOTE — PROGRESS NOTE ADULT - SUBJECTIVE AND OBJECTIVE BOX
Patient is a 80y old  Female who presents with a chief complaint of CHF exacerbation (24 Sep 2020 10:44)    HPI:    Patient is a 80 y.o F with PMH of Afib and  left atrial appendage thrombus (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  As per the patient, she climbed a flight of stairs and became winded. SOB worse with exertion. Denies CP, fever, and cough.     On admission , the patient was noted to have decreased breath sounds on the R and a pleural effusion on that side.  She walked to the bathroom, became tachypnic and SOB . Therefore , the patient  was then transferred to crit placed on bipap. Given 40 IV lasix, stabilized and then admitted to medicine.     Of note,     Pt was recently admitted and discharged on September 9th after a similar episode . She was admitted for pleural effusion, Acute dyspnea secondary to fluid overload secondary to acute congestive heart failure. She was discharged on lasix 20mg  Q48h   Pt was seen by Dr. Osorio outpatient and was told she needs an ECHO, stress test and possible cath. She has not gotten these yet.  (22 Sep 2020 14:51)    PAST MEDICAL & SURGICAL HISTORY:  Coronary stent patent  mid lad synergy manjinder 3.5x38mm  History of total knee replacement, unspecified laterality  H/O mastectomy, bilateral  Coronary angioplasty status  Aortic valve replaced  H/O aortic valve replacement      Vital Signs Last 24 Hrs  T(C): 36.7 (24 Sep 2020 15:41), Max: 36.7 (24 Sep 2020 00:19)  T(F): 98 (24 Sep 2020 15:41), Max: 98.1 (24 Sep 2020 00:19)  HR: 107 (24 Sep 2020 15:41) (81 - 107)  BP: 120/58 (24 Sep 2020 15:41) (108/53 - 127/59)  BP(mean): --  RR: 18 (24 Sep 2020 15:41) (16 - 18)  SpO2: 97% (24 Sep 2020 15:41) (96% - 97%)                        13.5   10.64 )-----------( 207      ( 24 Sep 2020 05:56 )             42.8     09-24    137  |  97<L>  |  37<H>  ----------------------------<  113<H>  4.5   |  27  |  1.4    Ca    9.0      24 Sep 2020 05:56  Phos  4.5     09-24  Mg     2.0     09-24    TPro  6.8  /  Alb  3.4<L>  /  TBili  0.4  /  DBili  x   /  AST  23  /  ALT  7   /  AlkPhos  96  09-24              MEDICATIONS  (STANDING):  aDENosine Injectable (ADENOSCAN) 60 milliGRAM(s) IV Bolus once  aspirin  chewable 81 milliGRAM(s) Oral daily  atorvastatin 20 milliGRAM(s) Oral at bedtime  diltiazem    milliGRAM(s) Oral daily  furosemide   Injectable 40 milliGRAM(s) IV Push daily  influenza   Vaccine 0.5 milliLiter(s) IntraMuscular once  rivaroxaban 15 milliGRAM(s) Oral daily     Patient is a 80y old  Female who presents with a chief complaint of CHF exacerbation (24 Sep 2020 10:44)    HPI:    Patient is a 80 y.o F with PMH of Afib and  left atrial appendage thrombus (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  As per the patient, she climbed a flight of stairs and became winded. SOB worse with exertion. Denies CP, fever, and cough.     On admission , the patient was noted to have decreased breath sounds on the R and a pleural effusion on that side.  She walked to the bathroom, became tachypnic and SOB . Therefore , the patient  was then transferred to crit placed on bipap. Given 40 IV lasix, stabilized and then admitted to medicine.     Pt was recently admitted and discharged on September 9th after a similar episode . She was admitted for pleural effusion, Acute dyspnea secondary to fluid overload secondary to acute congestive heart failure. She was discharged on lasix 20mg  Q48h   Pt was seen by Dr. Osorio outpatient and was told she needs an ECHO, stress test and possible cath. She has not gotten these yet.  (22 Sep 2020 14:51)    PAST MEDICAL & SURGICAL HISTORY:  Coronary stent patent  mid lad synergy manjinder 3.5x38mm  History of total knee replacement, unspecified laterality  H/O mastectomy, bilateral  Coronary angioplasty status  Aortic valve replaced  H/O aortic valve replacement    patient seen and examined independently on morning rounds, chart reviewed and discussed with the medicine resident    no overnight events--remains on telemetry- oob in chair and awaiting NST today    Vital Signs Last 24 Hrs  T(C): 36.7 (24 Sep 2020 15:41), Max: 36.7 (24 Sep 2020 00:19)  T(F): 98 (24 Sep 2020 15:41), Max: 98.1 (24 Sep 2020 00:19)  HR: 107 (24 Sep 2020 15:41) (81 - 107)  BP: 120/58 (24 Sep 2020 15:41) (108/53 - 127/59)  BP(mean): --  RR: 18 (24 Sep 2020 15:41) (16 - 18)  SpO2: 97% (24 Sep 2020 15:41) (96% - 97%)             PE:  GEN-NAD, AAOx3  PULM- fair air entry, decreased bs bilateral bases  CVS- +s1/s2 irreg irreg  GI- soft NT ND +bs, no rebound, no guarding  EXT- trace edema               13.5   10.64 )-----------( 207      ( 24 Sep 2020 05:56 )             42.8     09-24    137  |  97<L>  |  37<H>  ----------------------------<  113<H>  4.5   |  27  |  1.4    Ca    9.0      24 Sep 2020 05:56  Phos  4.5     09-24  Mg     2.0     09-24    TPro  6.8  /  Alb  3.4<L>  /  TBili  0.4  /  DBili  x   /  AST  23  /  ALT  7   /  AlkPhos  96  09-24              MEDICATIONS  (STANDING):  aDENosine Injectable (ADENOSCAN) 60 milliGRAM(s) IV Bolus once  aspirin  chewable 81 milliGRAM(s) Oral daily  atorvastatin 20 milliGRAM(s) Oral at bedtime  diltiazem    milliGRAM(s) Oral daily  furosemide   Injectable 40 milliGRAM(s) IV Push daily  influenza   Vaccine 0.5 milliLiter(s) IntraMuscular once  rivaroxaban 15 milliGRAM(s) Oral daily

## 2020-09-24 NOTE — PROGRESS NOTE ADULT - ASSESSMENT
1] Diastolic CHF, acute on chronic      Bilateral Pleural Effusion  - Pleural effusion decreased on latest CXR  - Continue diuretic therapy (Lasix)  - Keep K > 4 and Mg >1.8    2] CAD S/P PTCA/Stent (mLAD)       Exertional Shortness of Breath, ?Anginal Equivalent  - Continue Aspirin 81 mg tablet daily  - Consider pharmacological MPI today     3] AS S/P TAVR  -Echo reviewed by me.  Normal LV systolic function  - Prosthetic valve in aortic position  - Severe MAC; mildly thickened MV leaflets on echo  Mild Mitral Regurgitation and mild TR.    4] Paroxysmal Atrial Fibrillation  - On OAC with Xarelto 15 mg tablet daily.  Hold dose for today pending results of nuclear stress test  - On Diltiazem 180 mg CD cap daily for rate control    5] Hypertension  - Continue Furosemide and Diltiazem 180 mg CD cap daily    6] Chronic Kidney Disease Stage III  - Continue to monitor Creatinine    Plan discussed with Medical Attending and House Staff on the floor.  Plan discussed with patient at bedside.    León Bloom MD  [phone] Office

## 2020-09-24 NOTE — PROGRESS NOTE ADULT - SUBJECTIVE AND OBJECTIVE BOX
BRIAN BARRERA 80y Female  MRN#: [id number]   Hospital Day: 2d    HPI:  HPI:    Patient is a 80 y.o F with PMH of Afib and  left atrial appendage thrombus (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  As per the patient, she climbed a flight of stairs and became winded. SOB worse with exertion. Denies CP, fever, and cough.     On admission , the patient was noted to have decreased breath sounds on the R and a pleural effusion on that side.  She walked to the bathroom, became tachypnic and SOB . Therefore , the patient  was then transferred to crit placed on bipap. Given 40 IV lasix, stabilized and then admitted to medicine.     Of note,     Pt was recently admitted and discharged on September 9th after a similar episode . She was admitted for pleural effusion, Acute dyspnea secondary to fluid overload secondary to acute congestive heart failure. She was discharged on lasix 20mg  Q48h   Pt was seen by Dr. Osorio outpatient and was told she needs an ECHO, stress test and possible cath. She has not gotten these yet.  (22 Sep 2020 14:51)      SUBJECTIVE  Patient is a 80y old Female who presents with a chief complaint of CHF exacerbation (23 Sep 2020 11:26)  Currently admitted to medicine with the primary diagnosis of Shortness of breath      INTERVAL HPI AND OVERNIGHT EVENTS:  Patient was examined and seen at bedside. This morning she is resting comfortably in bed and reports no issues or overnight events.      OBJECTIVE  PAST MEDICAL & SURGICAL HISTORY  Coronary stent patent  mid lad synergy manjinder 3.5x38mm    History of total knee replacement, unspecified laterality    H/O mastectomy, bilateral    Coronary angioplasty status    Aortic valve replaced    H/O aortic valve replacement      ALLERGIES:  cefazolin (Flushing; Rash; Urticaria)  daptomycin (Flushing; Rash; Urticaria)  doxycycline (Flushing; Rash; Urticaria)  oxacillin (Flushing; Rash; Urticaria)    MEDICATIONS:  STANDING MEDICATIONS  aDENosine Injectable (ADENOSCAN) 60 milliGRAM(s) IV Bolus once  aspirin  chewable 81 milliGRAM(s) Oral daily  atorvastatin 20 milliGRAM(s) Oral at bedtime  diltiazem    milliGRAM(s) Oral daily  furosemide   Injectable 40 milliGRAM(s) IV Push daily  influenza   Vaccine 0.5 milliLiter(s) IntraMuscular once  rivaroxaban 15 milliGRAM(s) Oral daily    PRN MEDICATIONS      VITAL SIGNS: Last 24 Hours  T(C): 35.6 (24 Sep 2020 07:30), Max: 36.7 (24 Sep 2020 00:19)  T(F): 96 (24 Sep 2020 07:30), Max: 98.1 (24 Sep 2020 00:19)  HR: 87 (24 Sep 2020 07:30) (81 - 87)  BP: 108/53 (24 Sep 2020 07:30) (108/53 - 127/59)  BP(mean): --  RR: 17 (24 Sep 2020 07:30) (16 - 18)  SpO2: 96% (24 Sep 2020 06:05) (96% - 96%)    LABS:                        13.5   10.64 )-----------( 207      ( 24 Sep 2020 05:56 )             42.8     09-24    137  |  97<L>  |  37<H>  ----------------------------<  113<H>  4.5   |  27  |  1.4    Ca    9.0      24 Sep 2020 05:56  Phos  4.5     09-24  Mg     2.0     09-24    TPro  6.8  /  Alb  3.4<L>  /  TBili  0.4  /  DBili  x   /  AST  23  /  ALT  7   /  AlkPhos  96  09-24              CARDIAC MARKERS ( 22 Sep 2020 12:45 )  x     / <0.01 ng/mL / x     / x     / x          PHYSICAL EXAM:  CONSTITUTIONAL: No acute distress, well-developed, well-groomed, AAOx3  HEAD: Atraumatic, normocephalic  EYES: EOM intact, PERRLA, conjunctiva and sclera clear  ENT: Supple, no masses, no thyromegaly, no bruits, no JVD; moist mucous membranes  PULMONARY: Clear to auscultation bilaterally; no wheezes, rales, or rhonchi  CARDIOVASCULAR: Regular rate and rhythm; no murmurs, rubs, or gallops  GASTROINTESTINAL: Soft, non-tender, non-distended; bowel sounds present  MUSCULOSKELETAL: 1+ LE edema  NEUROLOGY: non-focal  SKIN: No rashes or lesions; warm and dry

## 2020-09-24 NOTE — PROGRESS NOTE ADULT - ASSESSMENT
80 y.o F with PMH of A.fib and left atrial appendage thrombus on xarelto, CAD s/p stent in the LAD, HFpEF, HTN, aortic stenosis s/p TAVR , b/l knee replacement presented with worsening SOB and dry cough on minimal exertion   Pt was recently admitted and discharged on September 9th after a similar episode. She was admitted for pleural effusion, Acute dyspnea secondary to fluid overload secondary to acute congestive heart failure.    #Dyspnea due to Acute on Chronic HFpEF; Improved   - SOB +ve. Orthopnea +ve.   - Troponin -ve x 1  - Repeat Chest X-ray showing Improving right-sided pleural effusion. Right lower/mid lung airspace opacities.  - Physical Exam: 1+ b/l LE edema. JVD +ve  - . Around baseline compared to last admission   - Echo done. pending read   - Monitor O2 Sat. Keep > 93%. Supplement as needed. BiPaP PRN  - Patient desaturated to 86% on ambulation without oxygen. Might need home oxygen. Will decide once fluid status optimized   - Daily weight. Strict I & Os. Keep I < O. Fluid restriction  - Patient reports being complaint with Diet, fluid restriction and medications at baseline   - Case discussed with Dr Bloom. Will order Adenosine stress test.  - Continue lasix 40mg IV QD for now. Will discharge on cardio recommendations for lasix      #CKD III - Continue to monitor. Cr stable. Monitor Cr while on lasix   #HTN - Continue diltiazem 180mg PO QD and lasix 40mg IV QD. Stopped Triamterene and HCTZ.  #Hx of CAD S/P stent in LAD - Continue ASA / Statin    #Hx of Aortic stenosis s/p TAVR - Continue xarelto 15mg PO QD  #Hx of LA appendage thrombus - Continue xarelto 15mg PO QD  #Obesity - BMI 33, patient counselled on the need for weight loss through diet and exercise.     #Misc  - DVT Prophylaxis: Xeralto  - Diet: DASH/TLC  - GI Prophylaxis: Pantoprazole   - Activity: AAT  - Code Status: Full Code    Dispo: Pending stress test

## 2020-09-24 NOTE — PROGRESS NOTE ADULT - ASSESSMENT
a/p:    #acute respiratory failure- Acute CHFpEF exacerbation  -cont tele monitoring  -appreciate cardiology following  -monitor i/o and daily weights- fluid restriction  -npo for NST today to r/o ischemia  -cont lasix 40 mg iv daily, asa, statin  -repaet am CXR  -cont o2 suppl/bipap prn    #AF   -cont xarelto and cardizem cd     #DVT/GI ppx    FULL CODE

## 2020-09-24 NOTE — PROGRESS NOTE ADULT - SUBJECTIVE AND OBJECTIVE BOX
Patient was seen and examined earlier today by me.    She states that her breathing is much improved.  She continues to have exertional shortness of breath.   She denies any chest pain with exertin.    Vitals:  T(C): 35.6 (09-24-20 @ 07:30), Max: 36.7 (09-22-20 @ 12:25)  HR: 87 (09-24-20 @ 07:30) (73 - 108)  BP: 108/53 (09-24-20 @ 07:30) (105/56 - 132/62)  RR: 17 (09-24-20 @ 07:30) (16 - 24)  SpO2: 96% (09-24-20 @ 06:05) (91% - 100%)      LABS:                        13.5   10.64 )-----------( 207      ( 24 Sep 2020 05:56 )             42.8     09-24    137  |  97<L>  |  37<H>  ----------------------------<  113<H>  4.5   |  27  |  1.4    Ca    9.0      24 Sep 2020 05:56  Phos  4.5     09-24  Mg     2.0     09-24    TPro  6.8  /  Alb  3.4<L>  /  TBili  0.4  /  DBili  x   /  AST  23  /  ALT  7   /  AlkPhos  96  09-24      MEDICATIONS  (STANDING):  aDENosine Injectable (ADENOSCAN) 60 milliGRAM(s) IV Bolus once  aspirin  chewable 81 milliGRAM(s) Oral daily  atorvastatin 20 milliGRAM(s) Oral at bedtime  diltiazem    milliGRAM(s) Oral daily  furosemide   Injectable 40 milliGRAM(s) IV Push daily  influenza   Vaccine 0.5 milliLiter(s) IntraMuscular once  rivaroxaban 15 milliGRAM(s) Oral daily    MEDICATIONS  (PRN):      PHYSICAL EXAM:  Constitutional: appears stated age, well developed/nourished, no acute distress  Eyes: EOM's intact.  PERRLA  ENMT: Normocephalic, atraumatic.    Neck: Jugular veins non-distended; no carotid bruits bilaterally.  Respiratory: respiratory pattern unlabored; no dullness to percussion; lungs clear to auscultation bilaterally (much improved compared to exam yesterday).  Cardiovascular: Irregular rhythm.  S1 and S2 normal.  No murmur nor rub appreciated.  Abdomen: Soft, non-tender.  Normal bowel sounds.  Extremities: extremities warm; no edema.  Skin: No gross abnormalities noted.  Musculoskeletal: No gross deformities  Neurological: Alert, oriented x 3.  No focal neurologic deficits noted.    Echo: results pending

## 2020-09-25 VITALS
RESPIRATION RATE: 17 BRPM | TEMPERATURE: 97 F | SYSTOLIC BLOOD PRESSURE: 109 MMHG | HEART RATE: 90 BPM | DIASTOLIC BLOOD PRESSURE: 63 MMHG | OXYGEN SATURATION: 93 %

## 2020-09-25 LAB
ALBUMIN SERPL ELPH-MCNC: 3.5 G/DL — SIGNIFICANT CHANGE UP (ref 3.5–5.2)
ALP SERPL-CCNC: 108 U/L — SIGNIFICANT CHANGE UP (ref 30–115)
ALT FLD-CCNC: 7 U/L — SIGNIFICANT CHANGE UP (ref 0–41)
ANION GAP SERPL CALC-SCNC: 11 MMOL/L — SIGNIFICANT CHANGE UP (ref 7–14)
AST SERPL-CCNC: 15 U/L — SIGNIFICANT CHANGE UP (ref 0–41)
BASOPHILS # BLD AUTO: 0.1 K/UL — SIGNIFICANT CHANGE UP (ref 0–0.2)
BASOPHILS NFR BLD AUTO: 1.1 % — HIGH (ref 0–1)
BILIRUB SERPL-MCNC: 0.4 MG/DL — SIGNIFICANT CHANGE UP (ref 0.2–1.2)
BUN SERPL-MCNC: 40 MG/DL — HIGH (ref 10–20)
CALCIUM SERPL-MCNC: 9 MG/DL — SIGNIFICANT CHANGE UP (ref 8.5–10.1)
CHLORIDE SERPL-SCNC: 98 MMOL/L — SIGNIFICANT CHANGE UP (ref 98–110)
CO2 SERPL-SCNC: 31 MMOL/L — SIGNIFICANT CHANGE UP (ref 17–32)
CREAT SERPL-MCNC: 1.6 MG/DL — HIGH (ref 0.7–1.5)
EOSINOPHIL # BLD AUTO: 0.26 K/UL — SIGNIFICANT CHANGE UP (ref 0–0.7)
EOSINOPHIL NFR BLD AUTO: 2.9 % — SIGNIFICANT CHANGE UP (ref 0–8)
GLUCOSE SERPL-MCNC: 112 MG/DL — HIGH (ref 70–99)
HCT VFR BLD CALC: 43.9 % — SIGNIFICANT CHANGE UP (ref 37–47)
HGB BLD-MCNC: 13.3 G/DL — SIGNIFICANT CHANGE UP (ref 12–16)
IMM GRANULOCYTES NFR BLD AUTO: 0.4 % — HIGH (ref 0.1–0.3)
LYMPHOCYTES # BLD AUTO: 1.79 K/UL — SIGNIFICANT CHANGE UP (ref 1.2–3.4)
LYMPHOCYTES # BLD AUTO: 20 % — LOW (ref 20.5–51.1)
MAGNESIUM SERPL-MCNC: 2.1 MG/DL — SIGNIFICANT CHANGE UP (ref 1.8–2.4)
MCHC RBC-ENTMCNC: 26 PG — LOW (ref 27–31)
MCHC RBC-ENTMCNC: 30.3 G/DL — LOW (ref 32–37)
MCV RBC AUTO: 85.7 FL — SIGNIFICANT CHANGE UP (ref 81–99)
MONOCYTES # BLD AUTO: 1.36 K/UL — HIGH (ref 0.1–0.6)
MONOCYTES NFR BLD AUTO: 15.2 % — HIGH (ref 1.7–9.3)
NEUTROPHILS # BLD AUTO: 5.38 K/UL — SIGNIFICANT CHANGE UP (ref 1.4–6.5)
NEUTROPHILS NFR BLD AUTO: 60.4 % — SIGNIFICANT CHANGE UP (ref 42.2–75.2)
NRBC # BLD: 0 /100 WBCS — SIGNIFICANT CHANGE UP (ref 0–0)
PHOSPHATE SERPL-MCNC: 4.4 MG/DL — SIGNIFICANT CHANGE UP (ref 2.1–4.9)
PLATELET # BLD AUTO: 233 K/UL — SIGNIFICANT CHANGE UP (ref 130–400)
POTASSIUM SERPL-MCNC: 4.3 MMOL/L — SIGNIFICANT CHANGE UP (ref 3.5–5)
POTASSIUM SERPL-SCNC: 4.3 MMOL/L — SIGNIFICANT CHANGE UP (ref 3.5–5)
PROT SERPL-MCNC: 6.7 G/DL — SIGNIFICANT CHANGE UP (ref 6–8)
RBC # BLD: 5.12 M/UL — SIGNIFICANT CHANGE UP (ref 4.2–5.4)
RBC # FLD: 14.3 % — SIGNIFICANT CHANGE UP (ref 11.5–14.5)
SODIUM SERPL-SCNC: 140 MMOL/L — SIGNIFICANT CHANGE UP (ref 135–146)
WBC # BLD: 8.93 K/UL — SIGNIFICANT CHANGE UP (ref 4.8–10.8)
WBC # FLD AUTO: 8.93 K/UL — SIGNIFICANT CHANGE UP (ref 4.8–10.8)

## 2020-09-25 PROCEDURE — 78452 HT MUSCLE IMAGE SPECT MULT: CPT | Mod: 26

## 2020-09-25 PROCEDURE — 71045 X-RAY EXAM CHEST 1 VIEW: CPT | Mod: 26

## 2020-09-25 PROCEDURE — 99239 HOSP IP/OBS DSCHRG MGMT >30: CPT

## 2020-09-25 PROCEDURE — 93018 CV STRESS TEST I&R ONLY: CPT

## 2020-09-25 PROCEDURE — 93016 CV STRESS TEST SUPVJ ONLY: CPT

## 2020-09-25 RX ORDER — POLYETHYLENE GLYCOL 3350 17 G/17G
17 POWDER, FOR SOLUTION ORAL
Refills: 0 | Status: DISCONTINUED | OUTPATIENT
Start: 2020-09-25 | End: 2020-09-25

## 2020-09-25 RX ORDER — SENNA PLUS 8.6 MG/1
2 TABLET ORAL
Refills: 0 | Status: DISCONTINUED | OUTPATIENT
Start: 2020-09-25 | End: 2020-09-25

## 2020-09-25 RX ORDER — FUROSEMIDE 40 MG
1 TABLET ORAL
Qty: 30 | Refills: 0
Start: 2020-09-25 | End: 2020-10-24

## 2020-09-25 RX ORDER — TRIAMTERENE/HYDROCHLOROTHIAZID 75 MG-50MG
1 TABLET ORAL
Qty: 0 | Refills: 0 | DISCHARGE

## 2020-09-25 RX ADMIN — Medication 180 MILLIGRAM(S): at 05:19

## 2020-09-25 RX ADMIN — Medication 40 MILLIGRAM(S): at 05:19

## 2020-09-25 NOTE — MEDICAL STUDENT PROGRESS NOTE(EDUCATION) - SUBJECTIVE AND OBJECTIVE BOX
Patient is a 80 year old female with PMH of A Fib Afib and  left atrial appendage thrombus (X1 year)  on xarelto, CAD sp stent in the LAD, HFpEF , HTN, and aortic stenosis sp TAVR ,  and BL knee replacement presents with worsening SOB and dry cough on minimal exertion  that started .  As per the patient, she climbed a flight of stairs and became winded. SOB worse with exertion. Denies CP, fever, and cough.     On admission , the patient was noted to have decreased breath sounds on the R and a pleural effusion on that side.  She walked to the bathroom, became tachypnic and SOB . Therefore , the patient  was then transferred to crit placed on bipap. Given 40 IV lasix, stabilized and then admitted to medicine.     Of note, Pt was recently admitted and discharged on  after a similar episode . She was admitted for pleural effusion, Acute dyspnea secondary to fluid overload secondary to acute congestive heart failure. She was discharged on lasix 20mg Q48h  Pt was seen by Dr. Osorio outpatient and was told she needs an ECHO, stress test and possible cath. She has not gotten these yet.  (22 Sep 2020 14:51)    Currently admitted to medicine with the primary diagnosis of Shortness of breath     Today is hospital day 3d.     INTERVAL HPI / OVERNIGHT EVENTS:  Patient was examined and seen at bedside. This morning she is resting comfortably in bed and reports no new issues or overnight events.     ROS: Otherwise unremarkable     PAST MEDICAL & SURGICAL HISTORY  Coronary stent patent  mid lad synergy manjinder 3.5x38mm    History of total knee replacement, unspecified laterality    H/O mastectomy, bilateral    Coronary angioplasty status    Aortic valve replaced    H/O aortic valve replacement      ALLERGIES  cefazolin (Flushing; Rash; Urticaria)  daptomycin (Flushing; Rash; Urticaria)  doxycycline (Flushing; Rash; Urticaria)  oxacillin (Flushing; Rash; Urticaria)    MEDICATIONS  STANDING MEDICATIONS  aDENosine Injectable (ADENOSCAN) 60 milliGRAM(s) IV Bolus once  aspirin  chewable 81 milliGRAM(s) Oral daily  atorvastatin 20 milliGRAM(s) Oral at bedtime  diltiazem    milliGRAM(s) Oral daily  furosemide   Injectable 40 milliGRAM(s) IV Push daily  influenza   Vaccine 0.5 milliLiter(s) IntraMuscular once  polyethylene glycol 3350 17 Gram(s) Oral two times a day  rivaroxaban 15 milliGRAM(s) Oral daily  senna 2 Tablet(s) Oral two times a day    PRN MEDICATIONS    VITALS:  T(F): 95.3  HR: 82  BP: 100/62  RR: 18  SpO2: 97%    PHYSICAL EXAM  GEN: NAD, Resting comfortably in bed  PULM: Clear to auscultation bilaterally, No wheezes  CVS: Regular rate and rhythm, S1-S2, no murmurs  ABD: Soft, non-tender, non-distended, no guarding  EXT: No edema  NEURO: A&Ox3, no focal deficits    LABS                        13.3   8.93  )-----------( 233      ( 25 Sep 2020 06:00 )             43.9         140  |  98  |  40<H>  ----------------------------<  112<H>  4.3   |  31  |  1.6<H>    Ca    9.0      25 Sep 2020 06:00  Phos  4.4       Mg     2.1         TPro  6.7  /  Alb  3.5  /  TBili  0.4  /  DBili  x   /  AST  15  /  ALT  7   /  AlkPhos  108      Troponin T, Serum: <0.01 ng/mL (20 @ 17:59)    CARDIAC MARKERS ( 24 Sep 2020 17:59 )  x     / <0.01 ng/mL / x     / x     / x        RADIOLOGY  EXAM:  XR CHEST PORTABLE URGENT 1V            PROCEDURE DATE:  2020            INTERPRETATION:  Chest x-ray portable single view    CLINICAL HISTORY / REASON FOR EXAM: Shortness of breath.    COMPARISON: 2020 .    TECHNIQUE/POSITIONING: Satisfactory. .    FINDINGS:    SUPPORT DEVICES: Status post TAVR    CARDIAC/MEDIASTINUM/HILUM: Enlarged but unchanged cardiac silhouette.    LUNG PARENCHYMA/PLEURA: Improving right-sided pleural effusion. Hazy airspace linear opacities within the right lower and midlung. No large pneumothorax..    SKELETON/SOFT TISSUES: Bilateral shoulder arthropathy.      IMPRESSION:    Improving right-sided pleural effusion. Right lower/mid lung airspace opacities.     EXAM:  ECHO TTE WO CON COMP W DOPP        PROCEDURE DATE:  2020      INTERPRETATION:  REPORT:  TRANSTHORACIC ECHOCARDIOGRAM REPORT        Patient Name:   BRIAN BARRERA Accession #: [id number]  Medical Rec #:  [id number]         Height:      59.0 in 149.9 cm  YOB: 1940        Weight:      160.0 lb 72.57 kg  Patient Age:    80 years         BSA:         1.68 m²  Patient Gender: F                BP:          105/56 mmHg      Date of Exam:        2020 11:27:32 AM  Referring Physician: [id number] OBDULIO GONSALEZ  Sonographer:         Estrella Brush  Reading Physician:   Robby Titus M.D.    Procedure:   2D Echo/Doppler/Color Doppler Complete.  Indications: I50.9 - Heart Failure, unspecified  Diagnosis:   Heart failure, unspecified - I50.9        Summary:   1. LV Ejection Fraction by Dave's Method with a biplane EF of 58 %.   2. Mildly enlarged left atrium.   3. Normal right atrial size.   4. Mild mitral valve regurgitation.   5. Thickening and calcification of the anterior and posterior mitral valve leaflets.   6. Mild tricuspid regurgitation.   7. TAVR in the aortic position. Peak/mean gradients of   17/9 mmHG across the valve.    PHYSICIAN INTERPRETATION:  Left Ventricle: The left ventricular internal cavity size is normal. Left ventricular wall thickness is normal.  Right Ventricle: Normal right ventricular size and function.  Left Atrium: Mildly enlarged left atrium.  Right Atrium: Normal right atrial size.  Pericardium: There is no evidence of pericardialeffusion.  Mitral Valve: Thickening and calcification of the anterior and posterior mitral valve leaflets. No evidence of mitral stenosis. Mild mitral valve regurgitation is seen.  Tricuspid Valve: Mild tricuspid regurgitation is visualized.  Aortic Valve: No evidence of aortic stenosis. Echo findings are consistent with normal structure and function of the aortic prosthesis. Trivial aortic valve regurgitation is seen.  Pulmonic Valve: Trace pulmonic valve regurgitation.  Aorta: The aortic root is normal in size and structure.  Venous: The inferior vena cava was normal sized, with respiratory size variation less than 50%.      2D AND M-MODE MEASUREMENTS (normal ranges within parentheses):  Left                  Normal   Aorta/LeftNormal  Ventricle:                     Atrium:  IVSd (2D):  1.13 cm  (0.7-1.1) AoV Cusp       1.20  (1.5-2.6)  LVPWd (2D): 0.69 cm  (0.7-1.1) Separation:     cm  LVIDd (2D): 3.45 cm  (3.4-5.7) Left Atrium    3.78  (1.9-4.0)  LVIDs (2D): 2.39 cm     (Mmode):        cm  LV FS (2D):  30.8 %   (>25%)   LA Volume      32.4  Relative      0.40    (<0.42)  Index         ml/m²  Wall  Thickness    SPECTRAL DOPPLER ANALYSIS:  LV DIASTOLIC FUNCTION:  MV Peak E: 1.33 m/s Decel Time: 312 msec  MV Peak A: 0.28 m/s  E/A Ratio: 4.84    Aortic Valve:  AoV VMax:    2.04 m/s  AoV VTI:     0.34 m  AoV Pk Grad: 16.7 mmHg  AoV Mn Grad: 7.7 mmHg    LVOT Vmax: 0.96 m/s  LVOT VTI:  0.17 m    Mitral Valve:  MV VMax:    1.32 m/s MV P1/2 Time: 90.54 msec  MV Mn Grad: 1.8 mmHg MV Area, PHT: 2.43 cm²    Tricuspid Valve and PA/RV Systolic Pressure: TR Max Velocity: 2.37 m/s RA Pressure: 8 mmHg RVSP/PASP: 30.5 mmHg    Pulmonic Valve:  PV Max Velocity: 0.83 m/s PV Max P.8 mmHg PV Mean PG: [id number]

## 2020-09-25 NOTE — DISCHARGE NOTE PROVIDER - CARE PROVIDER_API CALL
Marcio Pollard  GERIATRIC MEDICINE  [address] [place] [id number]  Phone: [phone]  Fax: [phone]  Follow Up Time: 1 week    León Bloom  CARDIOVASCULAR DISEASE  [address] [facility], [place] [address]  Phone: [phone]  Fax: [phone]  Follow Up Time: 2 weeks

## 2020-09-25 NOTE — DISCHARGE NOTE PROVIDER - NSDCHHNEEDSERVICE_GEN_ALL_CORE
Ostomy care and management/Central venous access care/Medication teaching and assessment/Observation and assessment/Teaching and training

## 2020-09-25 NOTE — PROGRESS NOTE ADULT - SUBJECTIVE AND OBJECTIVE BOX
Patient is a 80y old  Female who presents with a chief complaint of CHF exacerbation (24 Sep 2020 10:44)    HPI:    Patient is a 80 y.o F with PMH of Afib and  left atrial appendage thrombus (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  As per the patient, she climbed a flight of stairs and became winded. SOB worse with exertion. Denies CP, fever, and cough.     On admission , the patient was noted to have decreased breath sounds on the R and a pleural effusion on that side.  She walked to the bathroom, became tachypnic and SOB . Therefore , [facility] Given 40 IV lasix, stabilized and then admitted to medicine.     Pt was recently admitted and discharged on September 9th after a similar episode . She was admitted for pleural effusion, Acute dyspnea secondary to fluid overload secondary to acute congestive heart failure. She was discharged on lasix 20mg  Q48h   Pt was seen by Dr. Osorio outpatient and was told she needs an ECHO, stress test and possible cath. She has not gotten these yet.  (22 Sep 2020 14:51)    PAST MEDICAL & SURGICAL HISTORY:  Coronary stent patent  mid lad synergy manjinder [id number]  History of total knee replacement, unspecified laterality  H/O mastectomy, bilateral  Coronary angioplasty status  Aortic valve replaced  H/O aortic valve replacement    patient seen and examined independently on rounds this morning, chart reviewed and discussed with the medicine resident    no overnight events--going for pharm stress test today---remains on telemetry  off o2 this morning (was on overnight)--monitor o2 sat on RA with ambulation (if still hypoxic may need home o2 arranged)    PE:  GEN-NAD, AAOx3  PULM- fair air entry, decreased bs bilateral bases  CVS- +s1/s2 irreg irreg  GI- soft NT ND +bs, no rebound, no guarding  EXT- trace edema        Labs:                        13.3   8.93  )-----------( 233      ( 25 Sep 2020 06:00 )             43.9     CBC Full  -  ( 25 Sep 2020 06:00 )  WBC Count : 8.93 K/uL  RBC Count : 5.12 M/uL  Hemoglobin : 13.3 g/dL  Hematocrit : 43.9 %  Platelet Count - Automated : 233 K/uL  Mean Cell Volume : 85.7 fL  Mean Cell Hemoglobin : 26.0 pg  Mean Cell Hemoglobin Concentration : 30.3 g/dL  Auto Neutrophil # : 5.38 K/uL  Auto Lymphocyte # : 1.79 K/uL  Auto Monocyte # : 1.36 K/uL  Auto Eosinophil # : 0.26 K/uL  Auto Basophil # : 0.10 K/uL  Auto Neutrophil % : 60.4 %  Auto Lymphocyte % : 20.0 %  Auto Monocyte % : 15.2 %  Auto Eosinophil % : 2.9 %  Auto Basophil % : 1.1 %      09-25    140  |  98  |  40<H>  ----------------------------<  112<H>  4.3   |  31  |  1.6<H>    Ca    9.0      25 Sep 2020 06:00  Phos  4.4     09-25  Mg     2.1     09-25    TPro  6.7  /  Alb  3.5  /  TBili  0.4  /  DBili  x   /  AST  15  /  ALT  7   /  AlkPhos  108  09-25      Microbiology:      Vital Signs Last 24 Hrs  T(C): 35.2 (25 Sep 2020 08:00), Max: 36.7 (25 Sep 2020 00:00)  T(F): 95.3 (25 Sep 2020 08:00), Max: 98 (25 Sep 2020 00:00)  HR: 118 (25 Sep 2020 15:00) (82 - 118)  BP: 100/62 (25 Sep 2020 08:00) (100/49 - 136/73)  BP(mean): --  RR: 18 (25 Sep 2020 15:00) (16 - 18)  SpO2: 93% (25 Sep 2020 15:00) (93% - 98%)    I&O's Summary

## 2020-09-25 NOTE — MEDICAL STUDENT PROGRESS NOTE(EDUCATION) - NS MD HP STUD ASPLAN PLAN FT
1. Acute-on-chronic HFpEF, improved  - SOB +ve. Orthopnea +ve.   - Troponin -ve x 1  - Repeat Chest X-ray showing Improving right-sided pleural effusion. Right lower/mid lung airspace opacities.  - Physical Exam: 1+ b/l LE edema. JVD +ve  - . Around baseline compared to last admission   - TTE 09/23 = LFEF: 58%  - Patient desaturated to 86% on ambulation without oxygen. Might need home oxygen. Will decide once fluid status optimized   - Daily weight. Strict I & Os. Keep I < O. Fluid restriction  - complaint with Diet, fluid restriction and medications at baseline   - Case discussed with Dr Bloom, adenosine stress test.  - Monitor O2 Sat. Keep > 93%. Supplement as needed. BiPaP PRN  - Continue lasix 40mg IV QD for now  - as per cardio: cardio recommendations for lasix   - discontinued O2 2L via nasal cannula -> monitor if can tolerate ambulating  - possible discharge     2. CKD3  - Cr on admission = 1.6, Cr downtrending, Cr today 1.6  - Continue to monitor. Cr stable  - Monitor Cr while on lasix     3. HTN  - Continue diltiazem 180mg PO QD and lasix 40mg IV QD  - withheld Triamterene and HCTZ  - discharge on Triamterene & HCTZ, Lasix 40 mg PO    4. CAD s/p LAD stent  - Continue ASA / Statin      5. AS s/p TAVR  - Continue xXarelto 15mg PO QD    6. LA appendage thrombus  - Continue xarelto 15mg PO QD    7. Obesity  - BMI 33  - patient counselled on the need for weight loss through diet and exercise.     8. Healthcare maintenance  - DVT Prophylaxis: Xeralto 15 mg PO  - Diet: DASH/TLC  - GI Prophylaxis: Pantoprazole   - Activity: AAT  - Code Status: Full Code  - Disposition: pending stress test 1. Acute-on-chronic HFpEF, improved  - SOB +ve. Orthopnea +ve.   - Troponin -ve x 1  - Repeat Chest X-ray showing Improving right-sided pleural effusion. Right lower/mid lung airspace opacities.  - Physical Exam: 1+ b/l LE edema. JVD +ve  - . Around baseline compared to last admission   - TTE 09/23 = LFEF: 58%  - Patient desaturated to 86% on ambulation without oxygen. Might need home oxygen. Will decide once fluid status optimized   - Daily weight. Strict I & Os. Keep I < O. Fluid restriction  - complaint with Diet, fluid restriction and medications at baseline   - Case discussed with Dr Bloom, adenosine stress test.  - Monitor O2 Sat. Keep > 93%. Supplement as needed. BiPaP PRN  - Continue lasix 40mg IV QD for now  - as per cardio: cardio recommendations for lasix   - discontinued O2 2L via nasal cannula -> monitor if can tolerate ambulating  - possible discharge     2. CKD3  - Cr on admission = 1.6, Cr downtrending, Cr today 1.6  - Continue to monitor. Cr stable  - Monitor Cr while on lasix     3. HTN  - Continue diltiazem 180mg PO QD and lasix 40mg IV QD  - withheld Triamterene and HCTZ  - discharge on Triamterene & HCTZ, Lasix 40 mg PO    4. CAD s/p LAD stent  - Continue ASA / Statin      5. AS s/p TAVR  - Continue xXarelto 15mg PO QD    6. LA appendage thrombus  - Continue xarelto 15mg PO QD    7. Obesity  - BMI 33  - patient counselled on the need for weight loss through diet and exercise.     8. Healthcare maintenance  - DVT Prophylaxis: Xeralto 15 mg PO  - Diet: DASH/TLC  - GI Prophylaxis: Pantoprazole   - Activity: AAT  - Code Status: Full Code  - Disposition: pending stress test    NOTE: I have reviewed Sub Intern's note, corrections have been made. Dr Huertas [id number]

## 2020-09-25 NOTE — DISCHARGE NOTE PROVIDER - NSDCMRMEDTOKEN_GEN_ALL_CORE_FT
aspirin 81 mg oral tablet: 1 tab(s) orally once a day  atorvastatin 20 mg oral tablet: 1 tab(s) orally once a day  dilTIAZem 180 mg/24 hours oral capsule, extended release: 1 cap(s) orally once a day  Lasix 40 mg oral tablet: 1 tab(s) orally once a day   Xarelto 20 mg oral tablet: 1 tab(s) orally once a day (in the evening)

## 2020-09-25 NOTE — CHART NOTE - NSCHARTNOTEFT_GEN_A_CORE
<<<RESIDENT DISCHARGE NOTE>>>     patient's stress test is negative but upon coming back to stress test she is tachycardiac. 108-118. Will monitor for 3-4 more hours, can be side effect of drugs in stress test. Will start diet, will give 250 ml NS bolus. and follow HR. if less than 100 will discharge today, otherwise aticipating for tomorrow     BRIAN BARRERA  MRN-[id number]    VITAL SIGNS:  T(F): 95.3 (09-25-20 @ 08:00), Max: 98 (09-25-20 @ 00:00)  HR: 118 (09-25-20 @ 15:00)  BP: 100/62 (09-25-20 @ 08:00)  SpO2: 93% (09-25-20 @ 15:00)      PHYSICAL EXAMINATION:  General: Well appearing, no acute distress  Head & Neck: Normocephalic, atraumatic  Pulmonary: Lungs clear to auscultation bilaterally, no wheezing ronchi, rales  Cardiovascular: Regular rate, normal rhythm, S1&S2 auscultated  Gastrointestinal/Abdomen & Pelvis: Soft, nontender, nondistended, (+) bowel sounds  Neurologic/Motor: CN II-XII grossly intact, AAOx4    TEST RESULTS:                        13.3   8.93  )-----------( 233      ( 25 Sep 2020 06:00 )             43.9       09-25    140  |  98  |  40<H>  ----------------------------<  112<H>  4.3   |  31  |  1.6<H>    Ca    9.0      25 Sep 2020 06:00  Phos  4.4     09-25  Mg     2.1     09-25    TPro  6.7  /  Alb  3.5  /  TBili  0.4  /  DBili  x   /  AST  15  /  ALT  7   /  AlkPhos  108  09-25      FINAL DISCHARGE INTERVIEW:  Resident(s) Present: (Name:_______Aleksandar______), RN Present: (Name:  __Jacquie_________)    DISCHARGE MEDICATION RECONCILIATION  reviewed with Attending (Name:____Yakelin_______)

## 2020-09-25 NOTE — DISCHARGE NOTE PROVIDER - PROVIDER TOKENS
PROVIDER:[TOKEN:[[id number]],FOLLOWUP:[1 week]],PROVIDER:[TOKEN:[[id number]],FOLLOWUP:[2 weeks]]

## 2020-09-25 NOTE — DISCHARGE NOTE PROVIDER - CARE PROVIDERS DIRECT ADDRESSES
[email or web address]

## 2020-09-25 NOTE — DISCHARGE NOTE PROVIDER - HOSPITAL COURSE
Patient is a 80 y.o F with PMH of Afib and  left atrial appendage thrombus (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  As per the patient, she climbed a flight of stairs and became winded. SOB worse with exertion. Denies CP, fever, and cough.     On admission , the patient was noted to have decreased breath sounds on the R and a pleural effusion on that side.  She walked to the bathroom, became tachypnic and SOB . Therefore , [facility] Given 40 IV lasix, stabilized and then admitted to medicine.     Of note,     Pt was recently admitted and discharged on September 9th after a similar episode . She was admitted for pleural effusion, Acute dyspnea secondary to fluid overload secondary to acute congestive heart failure. She was discharged on lasix 20mg  Q48h   Pt was seen by Dr. Osorio outpatient and was told she needs an ECHO, stress test and possible cath. She has not gotten these yet.  (22 Sep 2020 14:51      Patient stayed in hospital for 3 days evaluated by medicine and cardiologist. Stress test has been done.     1] Diastolic CHF, acute on chronic  - Continue diuretic therapy (Lasix)    2] CAD S/P PTCA/Stent (mLAD)   Continue Aspirin 81 mg tablet daily  -Follow up with Dr Bain for Stress test result    3] AS S/P TAVR  -Echo Normal LV systolic function  - Prosthetic valve in aortic position  - Severe MAC; mildly thickened MV leaflets on echo  Mild Mitral Regurgitation and mild TR.  -Follow up with Dr Bain    4] Paroxysmal Atrial Fibrillation  - On OAC with Xarelto 15 mg tablet daily.    - On Diltiazem 180 mg CD cap daily for rate control    5] Hypertension  - Continue Furosemide and Diltiazem 180 mg CD cap daily    6] Chronic Kidney Disease Stage III  - Continue to monitor Creatinine    León Bloom MD  [phone] Office

## 2020-09-25 NOTE — MEDICAL STUDENT PROGRESS NOTE(EDUCATION) - NS MD HP STUD ASPLAN ASSES FT
Patient is a 80 year old F with PMH of A Fib on LA appendage thrombus on Xarelto, CAD s/p LAD stent, HFpEF (TTE 09/23: LVEF = 58%, HTN, and AS s/p TAVR and bilateral knee placement currently admitted for acute-on-chronic hypoxic failure due to CHF exacerbation.

## 2020-09-25 NOTE — DISCHARGE NOTE PROVIDER - NSDCCPCAREPLAN_GEN_ALL_CORE_FT
PRINCIPAL DISCHARGE DIAGNOSIS  Diagnosis: Shortness of breath  Assessment and Plan of Treatment: You were disanosed with Diastolic CHF, acute on chronic  - Continue diuretic therapy (Lasix)  For CAD S/P PTCA/Stent (mLAD)   - Continue Aspirin 81 mg tablet daily  -Follow up with Dr Bain for Stress test results      SECONDARY DISCHARGE DIAGNOSES  Diagnosis: Pleural effusion  Assessment and Plan of Treatment: Stable for now, Continue lasix as prescribed follow up with your PMD and cardiologist

## 2020-09-25 NOTE — PROGRESS NOTE ADULT - ASSESSMENT
a/p:    #acute respiratory failure- Acute CHFpEF exacerbation  -cont tele monitoring  -appreciate cardiology following  -monitor i/o and daily weights- fluid restriction  -npo for NST today to r/o ischemia--after returning to floor post NST patient was tachycardic ()---will cont tele overnight and montior hr- restart diet- stress test negative--anticipate likely dc home in am   -cont lasix 40 mg ---change to oral daily starting in am 9/26  -cont o2 suppl/bipap prn--    #AF   -cont xarelto and cardizem cd     #DVT/GI ppx    FULL CODE    will hold discharge now due to AF with RVR post-stress test and anticipate likely dc home in am

## 2020-09-29 DIAGNOSIS — I50.33 ACUTE ON CHRONIC DIASTOLIC (CONGESTIVE) HEART FAILURE: ICD-10-CM

## 2020-09-29 DIAGNOSIS — I08.1 RHEUMATIC DISORDERS OF BOTH MITRAL AND TRICUSPID VALVES: ICD-10-CM

## 2020-09-29 DIAGNOSIS — I25.10 ATHEROSCLEROTIC HEART DISEASE OF NATIVE CORONARY ARTERY WITHOUT ANGINA PECTORIS: ICD-10-CM

## 2020-09-29 DIAGNOSIS — Z95.2 PRESENCE OF PROSTHETIC HEART VALVE: ICD-10-CM

## 2020-09-29 DIAGNOSIS — I48.20 CHRONIC ATRIAL FIBRILLATION, UNSPECIFIED: ICD-10-CM

## 2020-09-29 DIAGNOSIS — Z96.653 PRESENCE OF ARTIFICIAL KNEE JOINT, BILATERAL: ICD-10-CM

## 2020-09-29 DIAGNOSIS — Z85.3 PERSONAL HISTORY OF MALIGNANT NEOPLASM OF BREAST: ICD-10-CM

## 2020-09-29 DIAGNOSIS — N18.3 CHRONIC KIDNEY DISEASE, STAGE 3 (MODERATE): ICD-10-CM

## 2020-09-29 DIAGNOSIS — J96.01 ACUTE RESPIRATORY FAILURE WITH HYPOXIA: ICD-10-CM

## 2020-09-29 DIAGNOSIS — Z79.01 LONG TERM (CURRENT) USE OF ANTICOAGULANTS: ICD-10-CM

## 2020-09-29 DIAGNOSIS — Z95.5 PRESENCE OF CORONARY ANGIOPLASTY IMPLANT AND GRAFT: ICD-10-CM

## 2020-09-29 DIAGNOSIS — Z79.82 LONG TERM (CURRENT) USE OF ASPIRIN: ICD-10-CM

## 2020-09-29 DIAGNOSIS — Z90.13 ACQUIRED ABSENCE OF BILATERAL BREASTS AND NIPPLES: ICD-10-CM

## 2020-09-29 DIAGNOSIS — I13.0 HYPERTENSIVE HEART AND CHRONIC KIDNEY DISEASE WITH HEART FAILURE AND STAGE 1 THROUGH STAGE 4 CHRONIC KIDNEY DISEASE, OR UNSPECIFIED CHRONIC KIDNEY DISEASE: ICD-10-CM

## 2020-09-29 DIAGNOSIS — Z87.891 PERSONAL HISTORY OF NICOTINE DEPENDENCE: ICD-10-CM

## 2020-09-29 DIAGNOSIS — R06.02 SHORTNESS OF BREATH: ICD-10-CM

## 2020-09-29 DIAGNOSIS — E66.9 OBESITY, UNSPECIFIED: ICD-10-CM

## 2020-09-29 DIAGNOSIS — R00.0 TACHYCARDIA, UNSPECIFIED: ICD-10-CM

## 2020-09-29 DIAGNOSIS — Z88.1 ALLERGY STATUS TO OTHER ANTIBIOTIC AGENTS STATUS: ICD-10-CM

## 2020-10-07 DIAGNOSIS — N18.3 CHRONIC KIDNEY DISEASE, STAGE 3 (MODERATE): ICD-10-CM

## 2020-10-07 DIAGNOSIS — Z88.1 ALLERGY STATUS TO OTHER ANTIBIOTIC AGENTS STATUS: ICD-10-CM

## 2021-01-21 ENCOUNTER — EMERGENCY (EMERGENCY)
Facility: HOSPITAL | Age: 81
LOS: 0 days | Discharge: HOME | End: 2021-01-22
Attending: EMERGENCY MEDICINE | Admitting: EMERGENCY MEDICINE
Payer: MEDICARE

## 2021-01-21 VITALS — HEIGHT: 59 IN

## 2021-01-21 DIAGNOSIS — R53.1 WEAKNESS: ICD-10-CM

## 2021-01-21 DIAGNOSIS — Z79.899 OTHER LONG TERM (CURRENT) DRUG THERAPY: ICD-10-CM

## 2021-01-21 DIAGNOSIS — Z98.890 OTHER SPECIFIED POSTPROCEDURAL STATES: ICD-10-CM

## 2021-01-21 DIAGNOSIS — R46.4 SLOWNESS AND POOR RESPONSIVENESS: ICD-10-CM

## 2021-01-21 DIAGNOSIS — Z79.82 LONG TERM (CURRENT) USE OF ASPIRIN: ICD-10-CM

## 2021-01-21 DIAGNOSIS — Z91.040 LATEX ALLERGY STATUS: ICD-10-CM

## 2021-01-21 DIAGNOSIS — Z87.891 PERSONAL HISTORY OF NICOTINE DEPENDENCE: ICD-10-CM

## 2021-01-21 DIAGNOSIS — Z96.659 PRESENCE OF UNSPECIFIED ARTIFICIAL KNEE JOINT: ICD-10-CM

## 2021-01-21 DIAGNOSIS — Z95.2 PRESENCE OF PROSTHETIC HEART VALVE: Chronic | ICD-10-CM

## 2021-01-21 DIAGNOSIS — Z88.8 ALLERGY STATUS TO OTHER DRUGS, MEDICAMENTS AND BIOLOGICAL SUBSTANCES STATUS: ICD-10-CM

## 2021-01-21 LAB
ABO RH CONFIRMATION: SIGNIFICANT CHANGE UP
ALBUMIN SERPL ELPH-MCNC: 2.4 G/DL — LOW (ref 3.5–5.2)
ALP SERPL-CCNC: 93 U/L — SIGNIFICANT CHANGE UP (ref 30–115)
ALT FLD-CCNC: 16 U/L — SIGNIFICANT CHANGE UP (ref 0–41)
ANISOCYTOSIS BLD QL: SIGNIFICANT CHANGE UP
APTT BLD: 27.9 SEC — SIGNIFICANT CHANGE UP (ref 27–39.2)
AST SERPL-CCNC: 34 U/L — SIGNIFICANT CHANGE UP (ref 0–41)
BASE EXCESS BLDV CALC-SCNC: -15.8 MMOL/L — LOW (ref -2–2)
BASOPHILS # BLD AUTO: 0 K/UL — SIGNIFICANT CHANGE UP (ref 0–0.2)
BASOPHILS NFR BLD AUTO: 0 % — SIGNIFICANT CHANGE UP (ref 0–1)
BILIRUB SERPL-MCNC: 0.4 MG/DL — SIGNIFICANT CHANGE UP (ref 0.2–1.2)
BLD GP AB SCN SERPL QL: SIGNIFICANT CHANGE UP
BUN SERPL-MCNC: 36 MG/DL — HIGH (ref 10–20)
CA-I SERPL-SCNC: 1.08 MMOL/L — LOW (ref 1.12–1.3)
CALCIUM SERPL-MCNC: 8.1 MG/DL — LOW (ref 8.5–10.1)
CHLORIDE SERPL-SCNC: 93 MMOL/L — LOW (ref 98–110)
CREAT SERPL-MCNC: 1.7 MG/DL — HIGH (ref 0.7–1.5)
EOSINOPHIL # BLD AUTO: 0 K/UL — SIGNIFICANT CHANGE UP (ref 0–0.7)
EOSINOPHIL NFR BLD AUTO: 0 % — SIGNIFICANT CHANGE UP (ref 0–8)
GAS PNL BLDV: 135 MMOL/L — LOW (ref 136–145)
GAS PNL BLDV: SIGNIFICANT CHANGE UP
GAS PNL BLDV: SIGNIFICANT CHANGE UP
GIANT PLATELETS BLD QL SMEAR: PRESENT — SIGNIFICANT CHANGE UP
GLUCOSE SERPL-MCNC: 214 MG/DL — HIGH (ref 70–99)
HCO3 BLDV-SCNC: 13 MMOL/L — LOW (ref 22–29)
HCT VFR BLD CALC: 16.9 % — LOW (ref 37–47)
HCT VFR BLDA CALC: 13.5 % — LOW (ref 34–44)
HGB BLD CALC-MCNC: 4.4 G/DL — CRITICAL LOW (ref 14–18)
HGB BLD-MCNC: 4.6 G/DL — CRITICAL LOW (ref 12–16)
HYPOCHROMIA BLD QL: SIGNIFICANT CHANGE UP
INR BLD: 5.45 RATIO — CRITICAL HIGH (ref 0.65–1.3)
LACTATE BLDV-MCNC: 17 MMOL/L — HIGH (ref 0.5–1.6)
LYMPHOCYTES # BLD AUTO: 14.8 % — LOW (ref 20.5–51.1)
LYMPHOCYTES # BLD AUTO: 5.02 K/UL — HIGH (ref 1.2–3.4)
MACROCYTES BLD QL: SLIGHT — SIGNIFICANT CHANGE UP
MANUAL SMEAR VERIFICATION: SIGNIFICANT CHANGE UP
MCHC RBC-ENTMCNC: 25.1 PG — LOW (ref 27–31)
MCHC RBC-ENTMCNC: 27.2 G/DL — LOW (ref 32–37)
MCV RBC AUTO: 92.3 FL — SIGNIFICANT CHANGE UP (ref 81–99)
METAMYELOCYTES # FLD: 1.7 % — HIGH (ref 0–0)
MICROCYTES BLD QL: SLIGHT — SIGNIFICANT CHANGE UP
MONOCYTES # BLD AUTO: 0.61 K/UL — HIGH (ref 0.1–0.6)
MONOCYTES NFR BLD AUTO: 1.8 % — SIGNIFICANT CHANGE UP (ref 1.7–9.3)
MYELOCYTES NFR BLD: 1.7 % — HIGH (ref 0–0)
NEUTROPHILS # BLD AUTO: 26.82 K/UL — HIGH (ref 1.4–6.5)
NEUTROPHILS NFR BLD AUTO: 76.5 % — HIGH (ref 42.2–75.2)
NEUTS BAND # BLD: 2.6 % — SIGNIFICANT CHANGE UP (ref 0–6)
NRBC # BLD: 3 /100 — HIGH (ref 0–0)
NRBC # BLD: SIGNIFICANT CHANGE UP /100 WBCS (ref 0–0)
OVALOCYTES BLD QL SMEAR: SLIGHT — SIGNIFICANT CHANGE UP
PCO2 BLDV: 43 MMHG — SIGNIFICANT CHANGE UP (ref 41–51)
PH BLDV: 7.07 — LOW (ref 7.26–7.43)
PLAT MORPH BLD: NORMAL — SIGNIFICANT CHANGE UP
PLATELET # BLD AUTO: 307 K/UL — SIGNIFICANT CHANGE UP (ref 130–400)
PO2 BLDV: 26 MMHG — SIGNIFICANT CHANGE UP (ref 20–40)
POIKILOCYTOSIS BLD QL AUTO: SIGNIFICANT CHANGE UP
POLYCHROMASIA BLD QL SMEAR: SIGNIFICANT CHANGE UP
POTASSIUM BLDV-SCNC: 4.8 MMOL/L — SIGNIFICANT CHANGE UP (ref 3.3–5.6)
POTASSIUM SERPL-MCNC: 5.1 MMOL/L — HIGH (ref 3.5–5)
POTASSIUM SERPL-SCNC: 5.1 MMOL/L — HIGH (ref 3.5–5)
PROT SERPL-MCNC: 5.8 G/DL — LOW (ref 6–8)
PROTHROM AB SERPL-ACNC: >40 SEC — HIGH (ref 9.95–12.87)
RBC # BLD: 1.83 M/UL — LOW (ref 4.2–5.4)
RBC # FLD: 23.4 % — HIGH (ref 11.5–14.5)
RBC BLD AUTO: ABNORMAL
SAO2 % BLDV: 21 % — SIGNIFICANT CHANGE UP
SODIUM SERPL-SCNC: 135 MMOL/L — SIGNIFICANT CHANGE UP (ref 135–146)
VARIANT LYMPHS # BLD: 0.9 % — SIGNIFICANT CHANGE UP (ref 0–5)
WBC # BLD: 33.91 K/UL — HIGH (ref 4.8–10.8)
WBC # FLD AUTO: 33.91 K/UL — HIGH (ref 4.8–10.8)

## 2021-01-21 PROCEDURE — 99291 CRITICAL CARE FIRST HOUR: CPT | Mod: 25

## 2021-01-21 PROCEDURE — 71045 X-RAY EXAM CHEST 1 VIEW: CPT | Mod: 26

## 2021-01-21 PROCEDURE — 93010 ELECTROCARDIOGRAM REPORT: CPT

## 2021-01-21 PROCEDURE — 76937 US GUIDE VASCULAR ACCESS: CPT | Mod: 26

## 2021-01-21 PROCEDURE — 36556 INSERT NON-TUNNEL CV CATH: CPT

## 2021-01-21 RX ORDER — VASOPRESSIN 20 [USP'U]/ML
0.04 INJECTION INTRAVENOUS
Qty: 50 | Refills: 0 | Status: DISCONTINUED | OUTPATIENT
Start: 2021-01-21 | End: 2021-01-22

## 2021-01-21 RX ORDER — FENTANYL CITRATE 50 UG/ML
50 INJECTION INTRAVENOUS ONCE
Refills: 0 | Status: DISCONTINUED | OUTPATIENT
Start: 2021-01-21 | End: 2021-01-21

## 2021-01-21 RX ORDER — SODIUM CHLORIDE 9 MG/ML
2000 INJECTION, SOLUTION INTRAVENOUS ONCE
Refills: 0 | Status: COMPLETED | OUTPATIENT
Start: 2021-01-21 | End: 2021-01-21

## 2021-01-21 RX ORDER — MEROPENEM 1 G/30ML
1000 INJECTION INTRAVENOUS ONCE
Refills: 0 | Status: COMPLETED | OUTPATIENT
Start: 2021-01-21 | End: 2021-01-21

## 2021-01-21 RX ORDER — PHENYLEPHRINE HYDROCHLORIDE 10 MG/ML
0.2 INJECTION INTRAVENOUS
Qty: 160 | Refills: 0 | Status: DISCONTINUED | OUTPATIENT
Start: 2021-01-21 | End: 2021-01-22

## 2021-01-21 RX ORDER — NOREPINEPHRINE BITARTRATE/D5W 8 MG/250ML
0.05 PLASTIC BAG, INJECTION (ML) INTRAVENOUS
Qty: 16 | Refills: 0 | Status: DISCONTINUED | OUTPATIENT
Start: 2021-01-21 | End: 2021-01-22

## 2021-01-21 RX ORDER — HYDROCORTISONE 20 MG
100 TABLET ORAL ONCE
Refills: 0 | Status: COMPLETED | OUTPATIENT
Start: 2021-01-21 | End: 2021-01-21

## 2021-01-21 RX ADMIN — SODIUM CHLORIDE 2000 MILLILITER(S): 9 INJECTION, SOLUTION INTRAVENOUS at 21:50

## 2021-01-21 RX ADMIN — SODIUM CHLORIDE 2000 MILLILITER(S): 9 INJECTION, SOLUTION INTRAVENOUS at 22:37

## 2021-01-21 RX ADMIN — FENTANYL CITRATE 50 MICROGRAM(S): 50 INJECTION INTRAVENOUS at 23:21

## 2021-01-21 RX ADMIN — Medication 2.81 MICROGRAM(S)/KG/MIN: at 22:25

## 2021-01-21 RX ADMIN — VASOPRESSIN 1.8 UNIT(S)/MIN: 20 INJECTION INTRAVENOUS at 22:37

## 2021-01-21 RX ADMIN — PHENYLEPHRINE HYDROCHLORIDE 2.44 MICROGRAM(S)/KG/MIN: 10 INJECTION INTRAVENOUS at 23:24

## 2021-01-21 RX ADMIN — Medication 100 MILLIGRAM(S): at 22:24

## 2021-01-21 RX ADMIN — MEROPENEM 100 MILLIGRAM(S): 1 INJECTION INTRAVENOUS at 22:42

## 2021-01-21 NOTE — ED PROCEDURE NOTE - CPROC ED INFUS LINE DETAIL1
The catheter was placed using sterile technique./Ultrasound guidance was used during placement./The guidewire was recovered./All lumen(s) aspirated and flushed without difficulty.

## 2021-01-21 NOTE — ED ADULT TRIAGE NOTE - CHIEF COMPLAINT QUOTE
Pt BIBA from  home. Pt has stage 4 lung CA.  As per daughter in law, Pt increasing lethargic, and weak, decreased PO intake. Pt not Pt BIBA from  home. Pt has stage 4 lung CA.  As per daughter in law, Pt increasing lethargic, and weak, decreased PO intake. Pt not responding in triage, unable to obtain vital signs.

## 2021-01-21 NOTE — ED PROVIDER NOTE - ATTENDING CONTRIBUTION TO CARE
I personally evaluated patient. I agree with the findings and plan with all documentation on chart except as documented  in my note.    81 y/o F PMH Metastatic Lung Cancer on Immunotherapy at [place], CABG, b/l mastectomy who presents to the ED unresponsive and acutely sick. Per EMS and family (son, daughter-in-law), patient received immunotherapy this past week. Monday and Tuesday she was out of it but relatively OK. She then acutely decompensated over the past 1-2 days. She woke up and had vomiting and diarrhea today and became unresponsive.    Patient is DNR/DNI and this was confirmed with health care proxy. Patient unresponsive and did not have a blood pressure with only a faint pulse. All hands team called, patient had immediate large bore IV placed x 2, emergent central line placement. Patient had 2 Liters of LR given via pressure bag and started on Levophed drip. Patient had and was max'd out on 3 vasopressors.    Labs sent, patient in profound metabolic acidosis with lactate of 17. Patient is not effectively ventilating and family spoken. Patient given broad spectrum IV antibiotics. I personally evaluated patient. I agree with the findings and plan with all documentation on chart except as documented  in my note.    81 y/o F PMH Metastatic Lung Cancer on Immunotherapy at [place], CABG, b/l mastectomy who presents to the ED unresponsive and acutely sick. Per EMS and family (son, daughter-in-law), patient received immunotherapy this past week. Monday and Tuesday she was out of it but relatively OK. She then acutely decompensated over the past 1-2 days. She woke up and had vomiting and diarrhea today and became unresponsive.    Patient is DNR/DNI and this was confirmed with health care proxy. Patient unresponsive and did not have a blood pressure with only a faint pulse. All hands team called, patient had immediate large bore IV placed x 2, emergent central line placement. Patient had 2 Liters of LR given via pressure bag and started on Levophed drip. Patient had and was max'd out on 3 vasopressors.    Labs sent, patient in profound metabolic acidosis with lactate of 17. Patient is not effectively ventilating and family spoken. Patient given broad spectrum IV antibiotics. Patient has profound anemia and was rapidly transfused blood. Labs reviewed. Son spoken top in person and is aware of gravity of condition and that patient will likely  in the next 24 hours. Repeat VBG pH 6.7. Patient is not effectively ventilating and has combined metabolic and respiratory acidosis. Family opting for comfort care. Will give pain control to patient and keep her comfortable.

## 2021-01-21 NOTE — ED ADULT NURSE NOTE - NSIMPLEMENTINTERV_GEN_ALL_ED
Implemented All Fall with Harm Risk Interventions:  [place] to call system. Call bell, personal items and telephone within reach. Instruct patient to call for assistance. Room bathroom lighting operational. Non-slip footwear when patient is off stretcher. Physically safe environment: no spills, clutter or unnecessary equipment. Stretcher in lowest position, wheels locked, appropriate side rails in place. Provide visual cue, wrist band, yellow gown, etc. Monitor gait and stability. Monitor for mental status changes and reorient to person, place, and time. Review medications for side effects contributing to fall risk. Reinforce activity limits and safety measures with patient and family. Provide visual clues: red socks.

## 2021-01-21 NOTE — ED ADULT NURSE NOTE - CHIEF COMPLAINT QUOTE
Pt BIBA from  home. Pt has stage 4 lung CA.  As per daughter in law, Pt increasing lethargic, and weak, decreased PO intake. Pt not responding in triage, unable to obtain vital signs.

## 2021-01-21 NOTE — ED PROVIDER NOTE - CLINICAL SUMMARY MEDICAL DECISION MAKING FREE TEXT BOX
79 y/o F PMH Metastatic Lung Cancer on Immunotherapy at [place], CABG, b/l mastectomy who presents to the ED unresponsive and acutely sick. Per EMS and family (son, daughter-in-law), patient received immunotherapy this past week. Monday and Tuesday she was out of it but relatively OK. She then acutely decompensated over the past 1-2 days. She woke up and had vomiting and diarrhea today and became unresponsive.    Patient is DNR/DNI and this was confirmed with health care proxy. Patient unresponsive and did not have a blood pressure with only a faint pulse. All hands team called, patient had immediate large bore IV placed x 2, emergent central line placement. Patient had 2 Liters of LR given via pressure bag and started on Levophed drip. Patient had and was max'd out on 3 vasopressors.    Labs sent, patient in profound metabolic acidosis with lactate of 17. Patient is not effectively ventilating and family spoken. Patient given broad spectrum IV antibiotics. Patient has profound anemia and was rapidly transfused blood. Labs reviewed. Son spoken top in person and is aware of gravity of condition and that patient will likely  in the next 24 hours. Repeat VBG pH 6.7. Patient is not effectively ventilating and has combined metabolic and respiratory acidosis. Family opting for comfort care. Will give pain control to patient and keep her comfortable.

## 2021-01-21 NOTE — ED PROVIDER NOTE - PROGRESS NOTE DETAILS
AH - pt Hb 4.6, WBC 33, Lactate 17; prbcs ordered, antibx started; pending remaining labs, imaging Discussed with family pt's current medical condition, labs, and poor prognosis. Family expresses understanding and wishes for comfort measures only. Will consult palliative care. -GAIL AH - pt maxed out on 3 pressors, VBG worsening; family notified, will come in to see pt; Palliative consulted AH - family bedside, would like comfort measures; holding pressors, Morphine administered

## 2021-01-22 VITALS — HEART RATE: 38 BPM | RESPIRATION RATE: 10 BRPM

## 2021-01-22 LAB
ANION GAP SERPL CALC-SCNC: 34 MMOL/L — HIGH (ref 7–14)
BASE EXCESS BLDV CALC-SCNC: -24.4 MMOL/L — LOW (ref -2–2)
CA-I SERPL-SCNC: 1.04 MMOL/L — LOW (ref 1.12–1.3)
CO2 SERPL-SCNC: 8 MMOL/L — CRITICAL LOW (ref 17–32)
GAS PNL BLDV: 138 MMOL/L — SIGNIFICANT CHANGE UP (ref 136–145)
GAS PNL BLDV: SIGNIFICANT CHANGE UP
GAS PNL BLDV: SIGNIFICANT CHANGE UP
HCO3 BLDV-SCNC: 8 MMOL/L — LOW (ref 22–29)
HCT VFR BLDA CALC: 16.2 % — LOW (ref 34–44)
HGB BLD CALC-MCNC: 5.3 G/DL — LOW (ref 14–18)
LACTATE BLDV-MCNC: 17 MMOL/L — HIGH (ref 0.5–1.6)
PCO2 BLDV: 53 MMHG — HIGH (ref 41–51)
PH BLDV: 6.78 — LOW (ref 7.26–7.43)
PO2 BLDV: 56 MMHG — HIGH (ref 20–40)
POTASSIUM BLDV-SCNC: 4.7 MMOL/L — SIGNIFICANT CHANGE UP (ref 3.3–5.6)
SAO2 % BLDV: 68 % — SIGNIFICANT CHANGE UP
SARS-COV-2 RNA SPEC QL NAA+PROBE: SIGNIFICANT CHANGE UP

## 2021-01-22 RX ORDER — FENTANYL CITRATE 50 UG/ML
50 INJECTION INTRAVENOUS ONCE
Refills: 0 | Status: DISCONTINUED | OUTPATIENT
Start: 2021-01-22 | End: 2021-01-22

## 2021-01-22 RX ORDER — MORPHINE SULFATE 50 MG/1
6 CAPSULE, EXTENDED RELEASE ORAL ONCE
Refills: 0 | Status: COMPLETED | OUTPATIENT
Start: 2021-01-22 | End: 2021-01-22

## 2021-01-22 RX ADMIN — FENTANYL CITRATE 50 MICROGRAM(S): 50 INJECTION INTRAVENOUS at 01:40

## 2021-01-22 NOTE — ED ADULT NURSE REASSESSMENT NOTE - NS ED NURSE REASSESS COMMENT FT1
Pt's family @ bedside, time of death called by MD Waterman 01/22/2021 @ 0211. Bala DICKENS notified @ [id number]

## 2021-01-27 LAB
CULTURE RESULTS: SIGNIFICANT CHANGE UP
CULTURE RESULTS: SIGNIFICANT CHANGE UP
SPECIMEN SOURCE: SIGNIFICANT CHANGE UP
SPECIMEN SOURCE: SIGNIFICANT CHANGE UP

## 2021-10-04 NOTE — SBIRT NOTE ADULT - NSSBIRTUNABLEREM_GEN_A_CORE
Never Hydroquinone Counseling:  Patient advised that medication may result in skin irritation, lightening (hypopigmentation), dryness, and burning.  In the event of skin irritation, the patient was advised to reduce the amount of the drug applied or use it less frequently.  Rarely, spots that are treated with hydroquinone can become darker (pseudoochronosis).  Should this occur, patient instructed to stop medication and call the office. The patient verbalized understanding of the proper use and possible adverse effects of hydroquinone.  All of the patient's questions and concerns were addressed.

## 2022-01-01 NOTE — ED ADULT NURSE NOTE - OBJECTIVE STATEMENT
- 36 hour [employer]    PT BIBA VIA [facility] FROM HOME FOR INCREASES LETHARGY, DECREASED PO INTAKE,PT WAS NOT RESPONSIVE IN TRIAGE. TACHYPNEIC AND HYPOXIC ON ARRIVAL. PT ALSO WAS HYPOTENSIVE.

## 2025-06-20 NOTE — PACU DISCHARGE NOTE - THE ANESTHESIA ORDERS USED IN THE PACU ORDER SET WILL BE DISCONTINUED UPON TRANSFER OF THIS PATIENT
[de-identified] : After discussing various treatment options with the patient including but not limited to oral medications, physical therapy, exercise modalities as well as interventional spinal injections, we have decided with the following plan:  - Continue home exercises, stretching, activity modification, physical therapy, and conservative care. - MRI report and/or images was reviewed and discussed with the patient. - Follow-up as needed. - Discussed KRISTIN. The risks, benefits, contents and alternatives to injection were explained in full to the patient.  Risks outlined include but are not limited to infection, sepsis, bleeding, nerve damage, temporary increase in pain, syncopal episode, failure to resolve symptoms, allergic reaction, symptom recurrence, and elevation of blood sugar in diabetics.  Patient understands the risks.  All questions were answered.  After discussion of options, patient requested an injection. Information regarding the injection was given to the patient.  Which medications to stop prior to the injection was explained to the patient as well. - Can try acupuncture and medical massage to help relieve pain.  Statement Selected